# Patient Record
Sex: FEMALE | Race: BLACK OR AFRICAN AMERICAN | NOT HISPANIC OR LATINO | Employment: OTHER | URBAN - METROPOLITAN AREA
[De-identification: names, ages, dates, MRNs, and addresses within clinical notes are randomized per-mention and may not be internally consistent; named-entity substitution may affect disease eponyms.]

---

## 2017-12-28 ENCOUNTER — HOSPITAL ENCOUNTER (EMERGENCY)
Facility: MEDICAL CENTER | Age: 22
End: 2017-12-28
Attending: EMERGENCY MEDICINE
Payer: MEDICAID

## 2017-12-28 VITALS
WEIGHT: 178.57 LBS | DIASTOLIC BLOOD PRESSURE: 76 MMHG | HEIGHT: 68 IN | BODY MASS INDEX: 27.06 KG/M2 | HEART RATE: 84 BPM | RESPIRATION RATE: 16 BRPM | TEMPERATURE: 98.4 F | SYSTOLIC BLOOD PRESSURE: 131 MMHG | OXYGEN SATURATION: 98 %

## 2017-12-28 DIAGNOSIS — K12.1 ORAL ULCER: ICD-10-CM

## 2017-12-28 PROCEDURE — 99283 EMERGENCY DEPT VISIT LOW MDM: CPT

## 2017-12-28 ASSESSMENT — PAIN SCALES - GENERAL: PAINLEVEL_OUTOF10: 5

## 2017-12-29 NOTE — DISCHARGE INSTRUCTIONS
Use over-the-counter Orajel     Oral Ulcers  Oral ulcers are painful, shallow sores around the lining of the mouth. They can affect the gums, the inside of the lips, and the cheeks. (Sores on the outside of the lips and on the face are different.) They typically first occur in school-aged children and teenagers. Oral ulcers may also be called canker sores or cold sores.  CAUSES   Canker sores and cold sores can be caused by many factors including:  · Infection.  · Injury.  · Sun exposure.  · Medications.  · Emotional stress.  · Food allergies.  · Vitamin deficiencies.  · Toothpastes containing sodium lauryl sulfate.  The herpes virus can be the cause of mouth ulcers. The first infection can be severe and cause 10 or more ulcers on the gums, tongue, and lips with fever and difficulty in swallowing. This infection usually occurs between the ages of 1 and 3 years.   SYMPTOMS   The typical sore is about ¼ inch (6 mm) in size and is an oval or round ulcer with red borders.  DIAGNOSIS   Your caregiver can diagnose simple oral ulcers by examination. Additional testing is usually not required.   TREATMENT   Treatment is aimed at pain relief. Generally, oral ulcers resolve by themselves within 1 to 2 weeks without medication and are not contagious unless caused by herpes (and other viruses). Antibiotics are not effective with mouth sores. Avoid direct contact with others until the ulcer is completely healed. See your caregiver for follow-up care as recommended. Also:  · Offer a soft diet.  · Encourage plenty of fluids to prevent dehydration. Popsicles and milk shakes can be helpful.  · Avoid acidic and salty foods and drinks such as orange juice.  · Infants and young children will often refuse to drink because of pain. Using a teaspoon, cup, or syringe to give small amounts of fluids frequently can help prevent dehydration.  · Cold compresses on the face may help reduce pain.  · Pain medication can help control  soreness.  · A solution of diphenhydramine mixed with a liquid antacid can be useful to decrease the soreness of ulcers. Consult a caregiver for the dosing.  · Liquids or ointments with a numbing ingredient may be helpful when used as recommended.  · Older children and teenagers can rinse their mouth with a salt-water mixture (1/2 teaspoon of salt in 8 ounces of water) four times a day. This treatment is uncomfortable but may reduce the time the ulcers are present.  · There are many over-the-counter throat lozenges and medications available for oral ulcers. Their effectiveness has not been studied.  · Consult your medical caregiver prior to using homeopathic treatments for oral ulcers.  SEEK MEDICAL CARE IF:   · You think your child needs to be seen.  · The pain worsens and you cannot control it.  · There are 4 or more ulcers.  · The lips and gums begin to bleed and crust.  · A single mouth ulcer is near a tooth that is causing a toothache or pain.  · Your child has a fever, swollen face, or swollen glands.  · The ulcers began after starting a medication.  · Mouth ulcers keep reoccurring or last more than 2 weeks.  · You think your child is not taking adequate fluids.  SEEK IMMEDIATE MEDICAL CARE IF:   · Your child has a high fever.  · Your child is unable to swallow or becomes dehydrated.  · Your child looks or acts very ill.  · An ulcer caused by a chemical your child accidentally put in their mouth.     This information is not intended to replace advice given to you by your health care provider. Make sure you discuss any questions you have with your health care provider.     Document Released: 01/25/2006 Document Revised: 01/08/2016 Document Reviewed: 09/09/2010  [employer] Patient Education ©2016 [employer]

## 2017-12-29 NOTE — ED PROVIDER NOTES
"ED Provider Note    CHIEF COMPLAINT  Chief Complaint   Patient presents with   • Mouth Ulcer       HPI  Monica Roman is a 22 y.o. female who presentsComplaining of a painful oral ulcer in her mouth. It hurts her to eat, especially spicy foods. She denies cough or cold symptoms. No vomiting or diarrhea    REVIEW OF SYSTEMS  See HPI for further details. No fever no chills. No cough or cold symptoms.    PAST MEDICAL HISTORY  Past Medical History:   Diagnosis Date   • Asthma     childhood       FAMILY HISTORY  History reviewed. No pertinent family history.    SOCIAL HISTORY  Social History     Social History   • Marital status: Single     Spouse name: N/A   • Number of children: N/A   • Years of education: N/A     Social History Main Topics   • Smoking status: Never Smoker   • Smokeless tobacco: Never Used   • Alcohol use Yes      Comment: Occasionally   • Drug use:      Types: Inhaled      Comment: THC daily    • Sexual activity: Not on file     Other Topics Concern   • Not on file     Social History Narrative   • No narrative on file       SURGICAL HISTORY  History reviewed. No pertinent surgical history.    CURRENT MEDICATIONS  Home Medications     Reviewed by Annika Bangura (Pharmacy Tech) on 12/28/17 at 1656  Med List Status: Complete   Medication Last Dose Status        Patient Noble Taking any Medications                       ALLERGIES  No Known Allergies    PHYSICAL EXAM  VITAL SIGNS: /69   Pulse 72   Temp 36.9 °C (98.4 °F)   Resp 18   Ht 1.727 m (5' 8\") Comment: Stated  Wt 81 kg (178 lb 9.2 oz)   LMP 12/28/2017   SpO2 99%   BMI 27.15 kg/m²    Constitutional: Well developed, Well nourished, No acute distress,Pleasant, smiling, conversant  HENT: Normocephalic, Atraumatic, Bilateral external ears normal, 2 2 x 3 mm ulcerations at the base of the 23rd and 24th tooth on the lingual side, Nose normal.   Eyes: PERRLA, EOMI,   Neck: No swelling. No malocclusion  Lymphatic: Lymphadenopathy. "   Cardiovascular: Normal heart rate, Normal rhythm, No murmurs,   Thorax & Lungs: Normal breath sounds bilaterally      COURSE & MEDICAL DECISION MAKING  Pertinent Labs & Imaging studies reviewed. (See chart for details)  Patient has oral ulcerations on the gumline. This is not widespread. I recommended over-the-counter Orajel. Patient was reassured and discharged home in stable condition    FINAL IMPRESSION  1. Oral ulcer  2.   3.             Electronically signed by: Mark Talbot, 12/28/2017 7:14 PM

## 2018-07-30 ENCOUNTER — OFFICE VISIT (OUTPATIENT)
Dept: INTERNAL MEDICINE | Facility: MEDICAL CENTER | Age: 23
End: 2018-07-30
Payer: MEDICAID

## 2018-07-30 VITALS
TEMPERATURE: 98.6 F | BODY MASS INDEX: 27.47 KG/M2 | WEIGHT: 175 LBS | HEIGHT: 67 IN | HEART RATE: 79 BPM | DIASTOLIC BLOOD PRESSURE: 69 MMHG | OXYGEN SATURATION: 100 % | SYSTOLIC BLOOD PRESSURE: 122 MMHG

## 2018-07-30 DIAGNOSIS — J45.20 MILD INTERMITTENT ASTHMA WITHOUT COMPLICATION: Chronic | ICD-10-CM

## 2018-07-30 DIAGNOSIS — M54.6 ACUTE MIDLINE THORACIC BACK PAIN: ICD-10-CM

## 2018-07-30 PROCEDURE — 99203 OFFICE O/P NEW LOW 30 MIN: CPT | Mod: GE | Performed by: INTERNAL MEDICINE

## 2018-07-30 RX ORDER — ALBUTEROL SULFATE 90 UG/1
2 AEROSOL, METERED RESPIRATORY (INHALATION) EVERY 6 HOURS PRN
Qty: 8.5 G | Refills: 0 | Status: SHIPPED | OUTPATIENT
Start: 2018-07-30

## 2018-07-30 ASSESSMENT — PATIENT HEALTH QUESTIONNAIRE - PHQ9: CLINICAL INTERPRETATION OF PHQ2 SCORE: 0

## 2018-07-30 NOTE — LETTER
July 30, 2018         Patient: Monica Roman   YOB: 1995   Date of Visit: 7/30/2018           To Whom it May Concern:    Monica Roman was seen in my clinic on 7/30/2018. She may return to work on 07/31/2018.    If you have any questions or concerns, please don't hesitate to call.        Sincerely,           Sharmin Monaco M.D.  Electronically Signed

## 2018-07-31 NOTE — PROGRESS NOTES
New Patient to Establish    Reason to establish: Acute Illness    CC: Back pain. Neck pain.    HPI: Patient is a 23-year-old female with history of asthma here to establish care with acute complaint of back and neck pain. Patient reports that her pain started about 2 weeks ago after she missed a step off of the sidewalk and felt a pulling sensation in her back. She reports that she now has back and neck pain at the end of the workday. Reports that she usually works about 8 hours day standing up and towards the end she has pain when bending down and moving her neck. Reports that her symptoms exacerbate with immobility and improve with massage and hot showers. She denies other trauma, history of back pain in the past, or history of back trauma.    Otherwise, the patient reports being generally healthy. She had childhood asthma that she grew out of and has not had any recent exacerbations. She's never had any surgeries. She smokes marijuana recreationally and drinks socially (usually involves binge drinking). Uses Depo-Provera and condoms for contraception and prevention of sexually transmitted infections respectively. All vaccinations are up-to-date including the Gardasil vaccine.    Patient Active Problem List    Diagnosis Date Noted   • Syncope 03/14/2016     Priority: High   • Asthma 03/15/2016     Priority: Low   • Sinus pause 03/14/2016       Past Medical History:   Diagnosis Date   • Asthma     childhood       Current Outpatient Prescriptions   Medication Sig Dispense Refill   • albuterol 108 (90 Base) MCG/ACT Aero Soln inhalation aerosol Inhale 2 Puffs by mouth every 6 hours as needed for Shortness of Breath. 8.5 g 0     No current facility-administered medications for this visit.        Allergies as of 07/30/2018   • (No Known Allergies)       Social History     Social History   • Marital status: Single     Spouse name: N/A   • Number of children: N/A   • Years of education: N/A     Occupational History   •  "Not on file.     Social History Main Topics   • Smoking status: Never Smoker   • Smokeless tobacco: Never Used   • Alcohol use Yes      Comment: Occasionally   • Drug use: Yes     Types: Inhaled      Comment: THC daily    • Sexual activity: Yes     Partners: Male     Birth control/ protection: Condom, Pill     Other Topics Concern   • Not on file     Social History Narrative   • No narrative on file       Family History   Problem Relation Age of Onset   • Arthritis Mother    • Autoimmune Disease Mother    • Hypertension Mother        History reviewed. No pertinent surgical history.    ROS: As per HPI. Additional pertinent symptoms as noted below.    All others negative    /69   Pulse 79   Temp 37 °C (98.6 °F)   Ht 1.702 m (5' 7\")   Wt 79.4 kg (175 lb)   SpO2 100%   BMI 27.41 kg/m²     Physical Exam  General:  Alert and oriented, No apparent distress.    Eyes: Pupils equal and reactive. No scleral icterus.    Throat: Clear no erythema or exudates noted.    Neck: Supple. No lymphadenopathy noted. Thyroid not enlarged.    Lungs: Clear to auscultation and percussion bilaterally.    Cardiovascular: Regular rate and rhythm. No murmurs, rubs or gallops.    Abdomen:  Benign. No rebound or guarding noted.    Extremities: No clubbing, cyanosis, edema.    Skin: Clear. No rash or suspicious skin lesions noted.    Other: No tenderness palpation along the spinal processes. No tenderness to palpation along the paraspinal muscles. Tense muscles in the neck and shoulders.    Note: I have reviewed all pertinent labs and diagnostic tests associated with this visit with specific comments listed under the assessment and plan below    Assessment and Plan    1. Acute midline thoracic back pain  Patient with back pain which is likely due to muscle strain. Patient has no red flags.  - Related patient with [facility] for home exercises for neck and back pain.  - Advised patient that she could use " over-the-counter ointments to help with her muscular pain such as icy hot, BenGay, or Tiger balm.  - Counseled the patient that if her back pain does not resolve in the next few weeks, we can provide her with a referral for formal physical therapy.    2. Mild intermittent asthma without complication  Patient currently asymptomatic. Has not had to use her albuterol inhaler in a long time.  - Refill albuterol inhaler for emergency use.      Followup: Return in about 1 year (around 7/30/2019), or if symptoms worsen or fail to improve.      Signed by: Sharmin Monaco M.D.

## 2019-02-24 ENCOUNTER — HOSPITAL ENCOUNTER (EMERGENCY)
Facility: MEDICAL CENTER | Age: 24
End: 2019-02-24
Attending: EMERGENCY MEDICINE
Payer: MEDICAID

## 2019-02-24 VITALS
WEIGHT: 161.16 LBS | RESPIRATION RATE: 18 BRPM | DIASTOLIC BLOOD PRESSURE: 65 MMHG | HEART RATE: 60 BPM | SYSTOLIC BLOOD PRESSURE: 114 MMHG | OXYGEN SATURATION: 96 % | BODY MASS INDEX: 25.9 KG/M2 | HEIGHT: 66 IN | TEMPERATURE: 99.6 F

## 2019-02-24 DIAGNOSIS — J02.9 EXUDATIVE PHARYNGITIS: ICD-10-CM

## 2019-02-24 PROCEDURE — 99283 EMERGENCY DEPT VISIT LOW MDM: CPT

## 2019-02-24 RX ORDER — PENICILLIN V POTASSIUM 500 MG/1
500 TABLET ORAL EVERY 6 HOURS
Qty: 40 TAB | Refills: 0 | Status: SHIPPED | OUTPATIENT
Start: 2019-02-24 | End: 2019-03-06

## 2019-02-24 RX ORDER — HYDROCODONE BITARTRATE AND ACETAMINOPHEN 5; 325 MG/1; MG/1
1-2 TABLET ORAL EVERY 4 HOURS PRN
Qty: 10 TAB | Refills: 0 | Status: SHIPPED | OUTPATIENT
Start: 2019-02-24 | End: 2019-03-01

## 2019-02-24 NOTE — ED PROVIDER NOTES
"ED Provider Note    CHIEF COMPLAINT  Chief Complaint   Patient presents with   • Sore Throat     sore throat  headache  x 3 days       HPI  Monica Roman is a 23 y.o. female who presents for evaluation of sore throat.  Patient presents with 3-day history of sore throat along with low-grade fever.  She denies: Nasal congestion, purulent rhinorrhea, cough, sputum, hemoptysis, abdominal pain.  She denies any possibility of pregnancy.  No other acute symptomatology or complaints.    REVIEW OF SYSTEMS  See HPI for further details.  No history of diabetes, thyroid dysfunction, seizures, cardiac disorders, gastrointestinal disorder, recurrent pharyngitis.  He does relate a history of asthma.  All other systems negative.    PAST MEDICAL HISTORY  Past Medical History:   Diagnosis Date   • Asthma     childhood       FAMILY HISTORY  Family History   Problem Relation Age of Onset   • Arthritis Mother    • Autoimmune Disease Mother    • Hypertension Mother        SOCIAL HISTORY  Non-smoker; occasional alcohol use; positive marijuana use;    SURGICAL HISTORY  History reviewed. No pertinent surgical history.    CURRENT MEDICATIONS  See nurse's notes    ALLERGIES  No Known Allergies    PHYSICAL EXAM  VITAL SIGNS: /68   Pulse (!) 56   Temp 37.6 °C (99.6 °F) (Temporal)   Resp 16   Ht 1.676 m (5' 6\")   Wt 73.1 kg (161 lb 2.5 oz)   LMP 02/18/2019 (Approximate)   SpO2 96%   Breastfeeding? No   BMI 26.01 kg/m²    Constitutional: 23-year-old female, well developed, Well nourished, sitting comfortably, oriented x3  HENT: Normocephalic, Atraumatic, Nares:Clear, Oropharynx: moist, well hydrated, posterior pharynx: Erythematous with whitish exudate; there are a few small round ulcerative lesions over the mucosa of the lip and buccal mucosal area;  Eyes: PERRL, EOMI, Conjunctiva normal, No discharge.   Neck: Normal range of motion, No tenderness, Supple, No stridor.   Lymphatic: No lymphadenopathy noted.   Cardiovascular: " Regular rate and rhythm without mumurs, gallups, rubs   Thorax & Lungs: Normal Equal breath sounds, No respiratory distress, No wheezing, no stridor, no rales. No chest tenderness.   Skin: Good skin turgor, pink, warm, dry. No rashes, petechiae, purpura. Normal capillary refill.   Neurologic: Alert & oriented x 3,  No gross focal deficits noted.   Psychiatric: Affect normal, Judgment normal, Mood normal.     COURSE & MEDICAL DECISION MAKING  Pertinent Labs & Imaging studies reviewed. (See chart for details)  Discussion: At this time, patient presents with findings consistent with an exudative pharyngitis.  Patient has no clinical findings I feel that antibiotic therapy is warranted.  I do not see any complications of require laboratory or imaging studies.  I discussed the findings and treatment plan with the patient.  She indicates she is comfortable with this explanation disposition.    FINAL IMPRESSION  1. Exudative pharyngitis           PLAN  1.  Appropriate discharge instructions given  2.  Pen-Vee K 500 mg #40  3.  Lortab 5 mg #10;  databank reviewed; informed consent obtained;  4.  Follow-up with primary care    Electronically signed by: Guy G Gansert, 2/24/2019 3:58 PM

## 2019-09-20 ENCOUNTER — APPOINTMENT (OUTPATIENT)
Dept: RADIOLOGY | Facility: IMAGING CENTER | Age: 24
End: 2019-09-20
Attending: FAMILY MEDICINE
Payer: COMMERCIAL

## 2019-09-20 ENCOUNTER — OFFICE VISIT (OUTPATIENT)
Dept: URGENT CARE | Facility: CLINIC | Age: 24
End: 2019-09-20
Payer: COMMERCIAL

## 2019-09-20 VITALS
OXYGEN SATURATION: 98 % | SYSTOLIC BLOOD PRESSURE: 104 MMHG | TEMPERATURE: 98.2 F | HEIGHT: 66 IN | DIASTOLIC BLOOD PRESSURE: 68 MMHG | RESPIRATION RATE: 18 BRPM | HEART RATE: 100 BPM | BODY MASS INDEX: 28.12 KG/M2 | WEIGHT: 175 LBS

## 2019-09-20 DIAGNOSIS — S63.615A SPRAIN OF LEFT RING FINGER, UNSPECIFIED SITE OF FINGER, INITIAL ENCOUNTER: ICD-10-CM

## 2019-09-20 PROCEDURE — 99214 OFFICE O/P EST MOD 30 MIN: CPT | Performed by: FAMILY MEDICINE

## 2019-09-20 PROCEDURE — 73140 X-RAY EXAM OF FINGER(S): CPT | Mod: TC,LT | Performed by: FAMILY MEDICINE
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
## 2019-09-26 ENCOUNTER — OFFICE VISIT (OUTPATIENT)
Dept: MEDICAL GROUP | Facility: CLINIC | Age: 24
End: 2019-09-26
Payer: COMMERCIAL

## 2019-09-26 VITALS
TEMPERATURE: 98.7 F | WEIGHT: 175 LBS | SYSTOLIC BLOOD PRESSURE: 116 MMHG | DIASTOLIC BLOOD PRESSURE: 72 MMHG | HEIGHT: 66 IN | OXYGEN SATURATION: 97 % | HEART RATE: 82 BPM | RESPIRATION RATE: 16 BRPM | BODY MASS INDEX: 28.12 KG/M2

## 2019-09-26 DIAGNOSIS — S62.655A NONDISPLACED FRACTURE OF MIDDLE PHALANX OF LEFT RING FINGER, INITIAL ENCOUNTER FOR CLOSED FRACTURE: ICD-10-CM

## 2019-09-26 PROCEDURE — 26720 TREAT FINGER FRACTURE EACH: CPT | Mod: F3 | Performed by: FAMILY MEDICINE

## 2019-09-26 ASSESSMENT — ENCOUNTER SYMPTOMS
HEADACHES: 0
VOMITING: 0
SHORTNESS OF BREATH: 0
DIZZINESS: 0
FEVER: 0
CHILLS: 0
NAUSEA: 0
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
## 2019-10-30 ENCOUNTER — NON-PROVIDER VISIT (OUTPATIENT)
Dept: URGENT CARE | Facility: CLINIC | Age: 24
End: 2019-10-30

## 2019-10-30 DIAGNOSIS — Z02.1 PRE-EMPLOYMENT DRUG SCREENING: Primary | ICD-10-CM

## 2019-10-30 LAB
AMP AMPHETAMINE: NORMAL
BAR BARBITURATES: NORMAL
BZO BENZODIAZEPINES: NORMAL
COC COCAINE: NORMAL
INT CON NEG: NEGATIVE
INT CON POS: POSITIVE
MDMA ECSTASY: NORMAL
MET METHAMPHETAMINES: NORMAL
MTD METHADONE: NORMAL
OPI OPIATES: NORMAL
OXY OXYCODONE: NORMAL
PCP PHENCYCLIDINE: NORMAL
POC URINE DRUG SCREEN OCDRS: NEGATIVE
THC: NORMAL

## 2019-10-30 PROCEDURE — 80305 DRUG TEST PRSMV DIR OPT OBS: CPT | Performed by: PHYSICIAN ASSISTANT

## 2020-06-22 ENCOUNTER — APPOINTMENT (OUTPATIENT)
Dept: RADIOLOGY | Facility: IMAGING CENTER | Age: 25
End: 2020-06-22
Attending: PHYSICIAN ASSISTANT
Payer: COMMERCIAL

## 2020-06-22 ENCOUNTER — OFFICE VISIT (OUTPATIENT)
Dept: URGENT CARE | Facility: CLINIC | Age: 25
End: 2020-06-22
Payer: COMMERCIAL

## 2020-06-22 VITALS
OXYGEN SATURATION: 98 % | DIASTOLIC BLOOD PRESSURE: 64 MMHG | BODY MASS INDEX: 27.31 KG/M2 | HEIGHT: 67 IN | SYSTOLIC BLOOD PRESSURE: 102 MMHG | HEART RATE: 103 BPM | WEIGHT: 174 LBS | TEMPERATURE: 98.6 F | RESPIRATION RATE: 12 BRPM

## 2020-06-22 DIAGNOSIS — M25.461 EFFUSION OF RIGHT KNEE JOINT: ICD-10-CM

## 2020-06-22 DIAGNOSIS — S86.911A KNEE STRAIN, RIGHT, INITIAL ENCOUNTER: ICD-10-CM

## 2020-06-22 DIAGNOSIS — M25.561 ACUTE PAIN OF RIGHT KNEE: ICD-10-CM

## 2020-06-22 PROCEDURE — 99214 OFFICE O/P EST MOD 30 MIN: CPT | Performed by: PHYSICIAN ASSISTANT

## 2020-06-22 PROCEDURE — 73564 X-RAY EXAM KNEE 4 OR MORE: CPT | Mod: TC,RT | Performed by: PHYSICIAN ASSISTANT

## 2020-06-22 RX ORDER — IBUPROFEN 800 MG/1
800 TABLET ORAL EVERY 8 HOURS PRN
Qty: 30 TAB | Refills: 0 | Status: SHIPPED | OUTPATIENT
Start: 2020-06-22

## 2020-06-22 ASSESSMENT — ENCOUNTER SYMPTOMS
SENSORY CHANGE: 0
SHORTNESS OF BREATH: 0
FEVER: 0
TINGLING: 0
WEAKNESS: 0
SORE THROAT: 0
CHILLS: 0
NAUSEA: 0
PALPITATIONS: 0
BLURRED VISION: 0
NUMBNESS: 0
VOMITING: 0

## 2020-06-22 NOTE — PROGRESS NOTES
Subjective:      Monica Roman is a 25 y.o. female who presents with Knee Pain (fell last month, would rub thc cream for relief but pain got worse over the weekend)      Knee Pain   This is a new problem. The current episode started 1 to 4 weeks ago (One month ago). The problem occurs constantly. The problem has been rapidly worsening (Has gotten much worse over the past 2 days). Pertinent negatives include no chest pain, chills, fever, nausea, numbness, sore throat, vomiting or weakness. Exacerbated by: Walking. Staying in any position for prolonged periods. Treatments tried: THC cream. The treatment provided mild relief.   Patient states that she fell last month.  She was having pain in her knee but was rubbing THC cream on it and states it was provided mild relief.  Says it was gradually improving but then over the weekend she went out and noticed that the next morning when she woke up she was having significant increased pain in the right knee.    Review of Systems   Constitutional: Negative for chills and fever.   HENT: Negative for sore throat.    Eyes: Negative for blurred vision.   Respiratory: Negative for shortness of breath.    Cardiovascular: Negative for chest pain and palpitations.   Gastrointestinal: Negative for nausea and vomiting.   Musculoskeletal: Positive for joint pain (right knee).   Neurological: Negative for tingling, sensory change, weakness and numbness.       PMH:  has a past medical history of Asthma.  MEDS:   Current Outpatient Medications:   •  albuterol 108 (90 Base) MCG/ACT Aero Soln inhalation aerosol, Inhale 2 Puffs by mouth every 6 hours as needed for Shortness of Breath. (Patient not taking: Reported on 9/20/2019), Disp: 8.5 g, Rfl: 0  ALLERGIES: No Known Allergies  SURGHX:   Past Surgical History:   Procedure Laterality Date   • DENTAL EXTRACTION(S)       SOCHX:  reports that she has never smoked. She has never used smokeless tobacco. She reports current alcohol use. She  "reports current drug use. Drug: Inhaled.  FH: Family history was reviewed, no pertinent findings to report     Objective:     /64   Pulse (!) 103   Temp 37 °C (98.6 °F) (Temporal)   Resp 12   Ht 1.689 m (5' 6.5\")   Wt 78.9 kg (174 lb)   SpO2 98%   BMI 27.66 kg/m²      Physical Exam  Constitutional:       Appearance: She is well-developed.   HENT:      Head: Normocephalic and atraumatic.      Right Ear: External ear normal.      Left Ear: External ear normal.   Eyes:      Conjunctiva/sclera: Conjunctivae normal.      Pupils: Pupils are equal, round, and reactive to light.   Cardiovascular:      Rate and Rhythm: Normal rate and regular rhythm.      Heart sounds: Normal heart sounds. No murmur.   Pulmonary:      Effort: Pulmonary effort is normal.      Breath sounds: Normal breath sounds. No wheezing.   Musculoskeletal:      Right knee: She exhibits decreased range of motion and swelling. She exhibits no ecchymosis, no deformity, no laceration, no LCL laxity, normal patellar mobility, no bony tenderness, normal meniscus and no MCL laxity. Tenderness found. MCL (mild) and patellar tendon (moderate TTP) tenderness noted. No medial joint line and no lateral joint line tenderness noted.      Comments: Antalgic gait.  Negative posterior and anterior drawer.  Negative Milagros's.  5/5 strength of lower extremities bilaterally.   Skin:     General: Skin is warm and dry.      Capillary Refill: Capillary refill takes less than 2 seconds.   Neurological:      Mental Status: She is alert and oriented to person, place, and time.   Psychiatric:         Behavior: Behavior normal.         Judgment: Judgment normal.         DX-KNEE COMPLETE 4+ RIGHT  IMPRESSION:     1.  Well-corticated ossific density anterior to the proximal tibia, likely related to old injury.  2.  No fracture or dislocation of RIGHT knee.  3.  Probable small joint effusion.      *X-rays were reviewed and interpreted independently by me. I agree with " the radiologist's findings     Assessment/Plan:     1. Acute pain of right knee  - DX-KNEE COMPLETE 4+ RIGHT; Future  - REFERRAL TO SPORTS MEDICINE  - ibuprofen (MOTRIN) 800 MG Tab; Take 1 Tab by mouth every 8 hours as needed.  Dispense: 30 Tab; Refill: 0    2. Effusion of right knee joint  - REFERRAL TO SPORTS MEDICINE  - ibuprofen (MOTRIN) 800 MG Tab; Take 1 Tab by mouth every 8 hours as needed.  Dispense: 30 Tab; Refill: 0    3. Knee strain, right, initial encounter  - REFERRAL TO SPORTS MEDICINE  - ibuprofen (MOTRIN) 800 MG Tab; Take 1 Tab by mouth every 8 hours as needed.  Dispense: 30 Tab; Refill: 0      - Right knee brace was applied  - Apply ice multiple times per day  - Keep elevated as much as possible  - Follow up with sports medicine for further evaluation and treatment

## 2022-02-21 ENCOUNTER — HOSPITAL ENCOUNTER (EMERGENCY)
Facility: MEDICAL CENTER | Age: 27
End: 2022-02-21
Attending: STUDENT IN AN ORGANIZED HEALTH CARE EDUCATION/TRAINING PROGRAM
Payer: COMMERCIAL

## 2022-02-21 VITALS
OXYGEN SATURATION: 100 % | HEIGHT: 66 IN | DIASTOLIC BLOOD PRESSURE: 71 MMHG | BODY MASS INDEX: 27.28 KG/M2 | HEART RATE: 58 BPM | SYSTOLIC BLOOD PRESSURE: 113 MMHG | TEMPERATURE: 97.8 F | WEIGHT: 169.75 LBS | RESPIRATION RATE: 18 BRPM

## 2022-02-21 DIAGNOSIS — L02.91 ABSCESS: Primary | ICD-10-CM

## 2022-02-21 LAB — HCG UR QL: NEGATIVE

## 2022-02-21 PROCEDURE — 700101 HCHG RX REV CODE 250: Performed by: STUDENT IN AN ORGANIZED HEALTH CARE EDUCATION/TRAINING PROGRAM

## 2022-02-21 PROCEDURE — A9270 NON-COVERED ITEM OR SERVICE: HCPCS | Performed by: STUDENT IN AN ORGANIZED HEALTH CARE EDUCATION/TRAINING PROGRAM

## 2022-02-21 PROCEDURE — 81025 URINE PREGNANCY TEST: CPT

## 2022-02-21 PROCEDURE — 303977 HCHG I & D

## 2022-02-21 PROCEDURE — 700102 HCHG RX REV CODE 250 W/ 637 OVERRIDE(OP): Performed by: STUDENT IN AN ORGANIZED HEALTH CARE EDUCATION/TRAINING PROGRAM

## 2022-02-21 PROCEDURE — 99283 EMERGENCY DEPT VISIT LOW MDM: CPT

## 2022-02-21 RX ORDER — LIDOCAINE HYDROCHLORIDE AND EPINEPHRINE BITARTRATE 20; .01 MG/ML; MG/ML
10 INJECTION, SOLUTION SUBCUTANEOUS ONCE
Status: DISCONTINUED | OUTPATIENT
Start: 2022-02-21 | End: 2022-02-21

## 2022-02-21 RX ORDER — IBUPROFEN 600 MG/1
600 TABLET ORAL ONCE
Status: COMPLETED | OUTPATIENT
Start: 2022-02-21 | End: 2022-02-21

## 2022-02-21 RX ORDER — SULFAMETHOXAZOLE AND TRIMETHOPRIM 800; 160 MG/1; MG/1
1 TABLET ORAL 2 TIMES DAILY
Qty: 10 TABLET | Refills: 0 | Status: SHIPPED | OUTPATIENT
Start: 2022-02-21 | End: 2022-02-26

## 2022-02-21 RX ADMIN — IBUPROFEN 600 MG: 600 TABLET, FILM COATED ORAL at 19:42

## 2022-02-21 RX ADMIN — LIDOCAINE HYDROCHLORIDE 10 ML: 10 INJECTION, SOLUTION INFILTRATION; PERINEURAL at 18:50

## 2022-02-21 ASSESSMENT — ENCOUNTER SYMPTOMS
SPEECH CHANGE: 0
FEVER: 0
LOSS OF CONSCIOUSNESS: 0
CHILLS: 0
EYE DISCHARGE: 0
EYE REDNESS: 0

## 2022-02-21 ASSESSMENT — LIFESTYLE VARIABLES: DO YOU DRINK ALCOHOL: NO

## 2022-02-22 NOTE — ED NOTES
Discharge instructions provided, pt verbalizes understanding and has no questions. Vital signs stable, pt ambulated out of ER with steady gate.  Pt given supplies to change bandage.

## 2022-02-22 NOTE — ED TRIAGE NOTES
"Chief Complaint   Patient presents with   • Abscess     Started three days ago and pt thought it was an ingrown hair.         Pt has abscess on groin area. Pt reports that the pain has gotten worse and she scheduled an appointment with OBGYN but couldn't wait. Pt was recently STD tested and everything was negative.       Pt ambulated to triage for above complaint.  Pt is AO x 4, follows commands, and responds appropriately to questions. Patient's breathing is unalbored and pain is currently 8/10 on the 0-10 pain scale.  Pt placed in lobby. Patient educated on triage process and encouraged to alert staff for any changes.    /76   Pulse (!) 105   Temp 36.3 °C (97.4 °F) (Temporal)   Resp 18   Ht 1.676 m (5' 6\")   Wt 77 kg (169 lb 12.1 oz)   SpO2 99%     "

## 2022-02-22 NOTE — ED PROVIDER NOTES
"ED Provider Note    Chief Complaint:   Pubic abscess    HPI:  Monica Roman is a very pleasant 26-year-old female who presents with swelling, pain, redness, tenderness which has been increasing for the past 3 days on her mons pubis.  Patient reports that she had thought it was an ingrown hair initially and attempted to \"pop it\".  Patient denies vaginal bleeding or discharge, dysuria, hematuria, fevers or chills.    Review of Systems:  Review of Systems   Constitutional: Negative for chills and fever.   Eyes: Negative for discharge and redness.   Neurological: Negative for speech change and loss of consciousness.       Past Medical History:   has a past medical history of Asthma.    Social History:  Social History     Tobacco Use   • Smoking status: Never Smoker   • Smokeless tobacco: Never Used   Vaping Use   • Vaping Use: Never used   Substance and Sexual Activity   • Alcohol use: Yes     Comment: Occasionally   • Drug use: Yes     Types: Inhaled     Comment: THC daily    • Sexual activity: Yes     Partners: Male     Birth control/protection: Condom, Pill       Surgical History:   has a past surgical history that includes dental extraction(s).    Allergies:  No Known Allergies    Physical Exam:  Vital Signs: /76   Pulse (!) 105   Temp 36.3 °C (97.4 °F) (Temporal)   Resp 18   Ht 1.676 m (5' 6\")   Wt 77 kg (169 lb 12.1 oz)   LMP 02/07/2022   SpO2 99%   BMI 27.40 kg/m²   Physical Exam  Constitutional:       Appearance: She is not toxic-appearing.   HENT:      Head: Normocephalic and atraumatic.   Pulmonary:      Effort: Pulmonary effort is normal. No respiratory distress.   Genitourinary:     Comments: Patient has a fluctuant mass with tenderness to palpation and small localized central abrasion at the right side of the mons pubis without evidence of discharge.  Vagina within normal limits externally.  Skin:     General: Skin is warm and dry.   Neurological:      Mental Status: She is alert. "         Labs:  Labs Reviewed   HCG QUALITATIVE UR       Radiology:  No orders to display        MDM:  Patient presenting with evidence of an abscess which is likely secondary to folliculitis.  Patient afebrile, denies constitutional symptoms, chills or myalgias, body aches, sepsis is inconsistent with patient presentation at this time.  Disposition pending abscess drainage.    Electronically signed by: Adalberto Phelan M.D., 2/21/2022, 6:28 PM    Incision and drainage carried out without complication, patient tolerated well.  Patient given Bactrim outpatient and instructed on ibuprofen and Tylenol for symptom control.  Patient given ibuprofen in the emergency department prior to discharge.    Repeat physical exam benign.  I doubt any serious emergency process at this time.  Patient and/or family, friends given strict return precautions and care instructions. They have demonstrated understanding of discharge instructions through teach back mechanism. Advised PCP follow-up in 1-2 days.  Patient/family/friend expresses understanding and agrees to plan.    This dictation has been created using voice recognition software. I am continuously working with the software to minimize the number of voice recognition errors and I have made every attempt to manually correct the errors within my dictation. However errors  related to this voice recognition software may still exist and should be interpreted within the appropriate context.     Electronically signed by: Adalberto Phelan M.D., 2/21/2022 7:17 PM      Procedure - Incision and Drainage    Patient consented for procedure following description of procedure and pros/cons explained.  Cleaned and draped per hospital procedure.  Pt positioned appropriately, 7 cc 1% lidocaine without epinephrine was used as a local anesthetic. #11 blade scalpal used for single incision. Additional local anesthetic injected into surrounding viable tissue prior to blunt dissection of  loculated adhesions. Copius drainage of pus (culture obtained).  Loop drainage was put in place without complications. Wound dressed with sterile 4x4 guaze and paper tape.  Patient counseled to have the drainage removed in 3 days, she reports she is following with gynecology at this time and may have this completed.      Disposition:  Home    Final Impression:  1. Abscess

## 2022-06-03 ENCOUNTER — NON-PROVIDER VISIT (OUTPATIENT)
Dept: OCCUPATIONAL MEDICINE | Facility: CLINIC | Age: 27
End: 2022-06-03

## 2022-06-03 DIAGNOSIS — Z02.89 ENCOUNTER FOR OCCUPATIONAL HEALTH ASSESSMENT: Primary | ICD-10-CM

## 2022-06-03 LAB
AMP AMPHETAMINE: NORMAL
COC COCAINE: NORMAL
INT CON NEG: NORMAL
INT CON POS: NORMAL
MET METHAMPHETAMINES: NORMAL
OPI OPIATES: NORMAL
PCP PHENCYCLIDINE: NORMAL
POC DRUG COMMENT 753798-OCCUPATIONAL HEALTH: NORMAL
THC: NORMAL

## 2022-06-03 PROCEDURE — 80305 DRUG TEST PRSMV DIR OPT OBS: CPT | Performed by: PREVENTIVE MEDICINE

## 2023-04-26 ENCOUNTER — HOSPITAL ENCOUNTER (EMERGENCY)
Facility: MEDICAL CENTER | Age: 28
End: 2023-04-26
Attending: EMERGENCY MEDICINE
Payer: MEDICAID

## 2023-04-26 ENCOUNTER — APPOINTMENT (OUTPATIENT)
Dept: RADIOLOGY | Facility: MEDICAL CENTER | Age: 28
End: 2023-04-26
Attending: EMERGENCY MEDICINE
Payer: MEDICAID

## 2023-04-26 VITALS
SYSTOLIC BLOOD PRESSURE: 107 MMHG | BODY MASS INDEX: 27.43 KG/M2 | WEIGHT: 181 LBS | HEIGHT: 68 IN | TEMPERATURE: 98 F | DIASTOLIC BLOOD PRESSURE: 65 MMHG | RESPIRATION RATE: 16 BRPM | OXYGEN SATURATION: 98 % | HEART RATE: 74 BPM

## 2023-04-26 DIAGNOSIS — O20.0 THREATENED MISCARRIAGE: ICD-10-CM

## 2023-04-26 LAB
ALBUMIN SERPL BCP-MCNC: 4.1 G/DL (ref 3.2–4.9)
ALBUMIN/GLOB SERPL: 1.7 G/DL
ALP SERPL-CCNC: 53 U/L (ref 30–99)
ALT SERPL-CCNC: 14 U/L (ref 2–50)
ANION GAP SERPL CALC-SCNC: 9 MMOL/L (ref 7–16)
APPEARANCE UR: CLEAR
AST SERPL-CCNC: 13 U/L (ref 12–45)
B-HCG SERPL-ACNC: 1890 MIU/ML (ref 0–5)
BASOPHILS # BLD AUTO: 0.8 % (ref 0–1.8)
BASOPHILS # BLD: 0.04 K/UL (ref 0–0.12)
BILIRUB SERPL-MCNC: 0.5 MG/DL (ref 0.1–1.5)
BILIRUB UR QL STRIP.AUTO: NEGATIVE
BUN SERPL-MCNC: 11 MG/DL (ref 8–22)
CALCIUM ALBUM COR SERPL-MCNC: 9 MG/DL (ref 8.5–10.5)
CALCIUM SERPL-MCNC: 9.1 MG/DL (ref 8.5–10.5)
CHLORIDE SERPL-SCNC: 108 MMOL/L (ref 96–112)
CO2 SERPL-SCNC: 22 MMOL/L (ref 20–33)
COLOR UR: YELLOW
CREAT SERPL-MCNC: 1.04 MG/DL (ref 0.5–1.4)
EOSINOPHIL # BLD AUTO: 0.11 K/UL (ref 0–0.51)
EOSINOPHIL NFR BLD: 2.2 % (ref 0–6.9)
ERYTHROCYTE [DISTWIDTH] IN BLOOD BY AUTOMATED COUNT: 46 FL (ref 35.9–50)
GFR SERPLBLD CREATININE-BSD FMLA CKD-EPI: 76 ML/MIN/1.73 M 2
GLOBULIN SER CALC-MCNC: 2.4 G/DL (ref 1.9–3.5)
GLUCOSE SERPL-MCNC: 83 MG/DL (ref 65–99)
GLUCOSE UR STRIP.AUTO-MCNC: NEGATIVE MG/DL
HCT VFR BLD AUTO: 39.5 % (ref 37–47)
HGB BLD-MCNC: 13.3 G/DL (ref 12–16)
IMM GRANULOCYTES # BLD AUTO: 0.01 K/UL (ref 0–0.11)
IMM GRANULOCYTES NFR BLD AUTO: 0.2 % (ref 0–0.9)
KETONES UR STRIP.AUTO-MCNC: NEGATIVE MG/DL
LEUKOCYTE ESTERASE UR QL STRIP.AUTO: NEGATIVE
LIPASE SERPL-CCNC: 22 U/L (ref 11–82)
LYMPHOCYTES # BLD AUTO: 2.07 K/UL (ref 1–4.8)
LYMPHOCYTES NFR BLD: 41.2 % (ref 22–41)
MCH RBC QN AUTO: 29.6 PG (ref 27–33)
MCHC RBC AUTO-ENTMCNC: 33.7 G/DL (ref 33.6–35)
MCV RBC AUTO: 88 FL (ref 81.4–97.8)
MICRO URNS: NORMAL
MONOCYTES # BLD AUTO: 0.4 K/UL (ref 0–0.85)
MONOCYTES NFR BLD AUTO: 8 % (ref 0–13.4)
NEUTROPHILS # BLD AUTO: 2.4 K/UL (ref 2–7.15)
NEUTROPHILS NFR BLD: 47.6 % (ref 44–72)
NITRITE UR QL STRIP.AUTO: NEGATIVE
NRBC # BLD AUTO: 0 K/UL
NRBC BLD-RTO: 0 /100 WBC
NUMBER OF RH DOSES IND 8505RD: NORMAL
PH UR STRIP.AUTO: 5.5 [PH] (ref 5–8)
PLATELET # BLD AUTO: 199 K/UL (ref 164–446)
PMV BLD AUTO: 9.5 FL (ref 9–12.9)
POTASSIUM SERPL-SCNC: 4.3 MMOL/L (ref 3.6–5.5)
PROT SERPL-MCNC: 6.5 G/DL (ref 6–8.2)
PROT UR QL STRIP: NEGATIVE MG/DL
RBC # BLD AUTO: 4.49 M/UL (ref 4.2–5.4)
RBC UR QL AUTO: NEGATIVE
RH BLD: NORMAL
SODIUM SERPL-SCNC: 139 MMOL/L (ref 135–145)
SP GR UR STRIP.AUTO: 1.02
UROBILINOGEN UR STRIP.AUTO-MCNC: 0.2 MG/DL
WBC # BLD AUTO: 5 K/UL (ref 4.8–10.8)

## 2023-04-26 PROCEDURE — 85025 COMPLETE CBC W/AUTO DIFF WBC: CPT

## 2023-04-26 PROCEDURE — 84702 CHORIONIC GONADOTROPIN TEST: CPT

## 2023-04-26 PROCEDURE — 83690 ASSAY OF LIPASE: CPT

## 2023-04-26 PROCEDURE — 36415 COLL VENOUS BLD VENIPUNCTURE: CPT

## 2023-04-26 PROCEDURE — 99284 EMERGENCY DEPT VISIT MOD MDM: CPT

## 2023-04-26 PROCEDURE — 80053 COMPREHEN METABOLIC PANEL: CPT

## 2023-04-26 PROCEDURE — 81003 URINALYSIS AUTO W/O SCOPE: CPT

## 2023-04-26 PROCEDURE — 76801 OB US < 14 WKS SINGLE FETUS: CPT

## 2023-04-26 PROCEDURE — 86901 BLOOD TYPING SEROLOGIC RH(D): CPT

## 2023-04-26 NOTE — ED TRIAGE NOTES
"Chief Complaint   Patient presents with    Vaginal Bleeding     C/o vaginal bleeding this am describes as \"spotting\". Approximately 12 weeks pregnant . Denies dysuria.     Abdominal Cramping     Rates pain as 3/10. NAD.   Vitals:    23 1153   BP: 102/68   Pulse: 70   Resp: 18   Temp: 36.7 °C (98.1 °F)   SpO2: 100%       "

## 2023-04-26 NOTE — ED PROVIDER NOTES
"ED Provider Note    CHIEF COMPLAINT  Chief Complaint   Patient presents with    Vaginal Bleeding     C/o vaginal bleeding this am describes as \"spotting\". Approximately 12 weeks pregnant . Denies dysuria.     Abdominal Cramping         HPI/ROS  Monica Roman is a 28 y.o. female who presents with vaginal bleeding.  The patient states her last normal menstrual period was the end of February.  She states she had a home pregnancy test and then followed up with the health department that did confirm a positive pregnancy test.  She has not seen an obstetrician and gynecologist.  She presents with cramping abdominal discomfort and vaginal bleeding that started this morning.  She states is more spotting.  She has not had any vomiting.  She denies difficulties with urination.  She has not had any associated fevers.  She does not have any vaginal discharge.  She is a .    PAST MEDICAL HISTORY   has a past medical history of Asthma.    SURGICAL HISTORY   has a past surgical history that includes dental extraction(s).    FAMILY HISTORY  Family History   Problem Relation Age of Onset    Arthritis Mother     Autoimmune Disease Mother     Hypertension Mother        SOCIAL HISTORY  Social History     Tobacco Use    Smoking status: Never    Smokeless tobacco: Never   Vaping Use    Vaping Use: Never used   Substance and Sexual Activity    Alcohol use: Yes     Comment: Occasionally    Drug use: Yes     Types: Inhaled     Comment: THC daily     Sexual activity: Yes     Partners: Male     Birth control/protection: Condom, Pill       CURRENT MEDICATIONS  Home Medications       Reviewed by Dorothea Christie R.N. (Registered Nurse) on 23 at 1215  Med List Status: Partial     Medication Last Dose Status   albuterol 108 (90 Base) MCG/ACT Aero Soln inhalation aerosol  Active   ibuprofen (MOTRIN) 800 MG Tab  Active                    ALLERGIES  No Known Allergies    PHYSICAL EXAM  VITAL SIGNS: /68   Pulse 70   Temp 36.7 °C " "(98.1 °F) (Temporal)   Resp 18   Ht 1.727 m (5' 8\")   Wt 82.1 kg (181 lb)   LMP  (LMP Unknown)   SpO2 100%   BMI 27.52 kg/m²    In general the patient does not appear toxic    HEENT unremarkable    Pulmonary chest clear to auscultation bilaterally    Cardiovascular S1-S2 with a regular rate and rhythm    GI abdomen soft with no tenderness and no distention.  The patient has normal bowel sounds     cervix is closed and nontender.  There is no active bleeding or vaginal discharge    Skin no pallor    Extremities no edema    Neurologic examination is grossly intact    DIAGNOSTIC STUDIES  Results for orders placed or performed during the hospital encounter of 04/26/23   CBC WITH DIFFERENTIAL   Result Value Ref Range    WBC 5.0 4.8 - 10.8 K/uL    RBC 4.49 4.20 - 5.40 M/uL    Hemoglobin 13.3 12.0 - 16.0 g/dL    Hematocrit 39.5 37.0 - 47.0 %    MCV 88.0 81.4 - 97.8 fL    MCH 29.6 27.0 - 33.0 pg    MCHC 33.7 33.6 - 35.0 g/dL    RDW 46.0 35.9 - 50.0 fL    Platelet Count 199 164 - 446 K/uL    MPV 9.5 9.0 - 12.9 fL    Neutrophils-Polys 47.60 44.00 - 72.00 %    Lymphocytes 41.20 (H) 22.00 - 41.00 %    Monocytes 8.00 0.00 - 13.40 %    Eosinophils 2.20 0.00 - 6.90 %    Basophils 0.80 0.00 - 1.80 %    Immature Granulocytes 0.20 0.00 - 0.90 %    Nucleated RBC 0.00 /100 WBC    Neutrophils (Absolute) 2.40 2.00 - 7.15 K/uL    Lymphs (Absolute) 2.07 1.00 - 4.80 K/uL    Monos (Absolute) 0.40 0.00 - 0.85 K/uL    Eos (Absolute) 0.11 0.00 - 0.51 K/uL    Baso (Absolute) 0.04 0.00 - 0.12 K/uL    Immature Granulocytes (abs) 0.01 0.00 - 0.11 K/uL    NRBC (Absolute) 0.00 K/uL   COMP METABOLIC PANEL   Result Value Ref Range    Sodium 139 135 - 145 mmol/L    Potassium 4.3 3.6 - 5.5 mmol/L    Chloride 108 96 - 112 mmol/L    Co2 22 20 - 33 mmol/L    Anion Gap 9.0 7.0 - 16.0    Glucose 83 65 - 99 mg/dL    Bun 11 8 - 22 mg/dL    Creatinine 1.04 0.50 - 1.40 mg/dL    Calcium 9.1 8.5 - 10.5 mg/dL    AST(SGOT) 13 12 - 45 U/L    ALT(SGPT) 14 2 - " 50 U/L    Alkaline Phosphatase 53 30 - 99 U/L    Total Bilirubin 0.5 0.1 - 1.5 mg/dL    Albumin 4.1 3.2 - 4.9 g/dL    Total Protein 6.5 6.0 - 8.2 g/dL    Globulin 2.4 1.9 - 3.5 g/dL    A-G Ratio 1.7 g/dL   LIPASE   Result Value Ref Range    Lipase 22 11 - 82 U/L   HCG QUANTITATIVE   Result Value Ref Range    Bhcg 1890.0 (H) 0.0 - 5.0 mIU/mL   RH TYPE FOR RHOGAM FROM E.D.   Result Value Ref Range    Emergency Department Rh Typing POS     Number Of Rh Doses Indicated ZERO    CORRECTED CALCIUM   Result Value Ref Range    Correct Calcium 9.0 8.5 - 10.5 mg/dL   ESTIMATED GFR   Result Value Ref Range    GFR (CKD-EPI) 76 >60 mL/min/1.73 m 2       RADIOLOGY  US-OB 1ST TRIMESTER WITH TRANSVAGINAL (COMBO)   Final Result      1.  Single living intrauterine pregnancy at 5 weeks, 0 days estimated gestational age.          COURSE & MEDICAL DECISION MAKING    ED Observation Status? Yes; I am placing the patient in to an observation status due to a diagnostic uncertainty as well as therapeutic intensity. Patient placed in observation status at 1:06 PM, 4/26/2023.     Observation plan is as follows: The patient presents with vaginal bleeding with a known pregnancy test.  She will require laboratory analysis including quantitative beta-hCG and Rh factor.  She will require an ultrasound to make sure there is no evidence of an ectopic pregnancy.  Therefore she will be admitted to emergency department under observation status as laboratory analysis and imaging is obtained.    1610 the patient was reexamined and continues have a benign abdominal exam.  She is not hypotensive nor is she anemic.  Her Rh factor is positive.  Ultrasound does show a single live intrauterine pregnancy at 5 weeks.  The patient will be discharged home with instructions to follow-up with the pregnancy center.  She will return for increased pain or bleeding.    Upon Reevaluation, the patient's condition has: Improved; and will be discharged.    Patient discharged  from ED Observation status at 1610 (Time) 4/26/23 (Date).       FINAL DIAGNOSIS  Threatened miscarriage    Disposition  Patient will be discharged in stable condition       Electronically signed by: Candido Streeter M.D., 4/26/2023 1:01 PM

## 2023-04-26 NOTE — ED NOTES
Pt given discharge instructions/home care instructions explained, pt verbalized understanding of instructions given/pt understands the importance of follow up, pt ambulatory to ER [facility].

## 2023-04-27 ENCOUNTER — APPOINTMENT (OUTPATIENT)
Dept: OBGYN | Facility: CLINIC | Age: 28
End: 2023-04-27
Payer: MEDICAID

## 2023-05-04 ENCOUNTER — HOSPITAL ENCOUNTER (EMERGENCY)
Facility: MEDICAL CENTER | Age: 28
End: 2023-05-04
Attending: EMERGENCY MEDICINE
Payer: MEDICAID

## 2023-05-04 ENCOUNTER — APPOINTMENT (OUTPATIENT)
Dept: RADIOLOGY | Facility: MEDICAL CENTER | Age: 28
End: 2023-05-04
Attending: EMERGENCY MEDICINE
Payer: MEDICAID

## 2023-05-04 VITALS
TEMPERATURE: 97.9 F | BODY MASS INDEX: 29.8 KG/M2 | WEIGHT: 185.41 LBS | OXYGEN SATURATION: 98 % | HEIGHT: 66 IN | SYSTOLIC BLOOD PRESSURE: 115 MMHG | RESPIRATION RATE: 15 BRPM | HEART RATE: 70 BPM | DIASTOLIC BLOOD PRESSURE: 66 MMHG

## 2023-05-04 DIAGNOSIS — O02.0 BLIGHTED OVUM: ICD-10-CM

## 2023-05-04 DIAGNOSIS — O20.9 VAGINAL BLEEDING IN PREGNANCY, FIRST TRIMESTER: ICD-10-CM

## 2023-05-04 LAB
ALBUMIN SERPL BCP-MCNC: 4.2 G/DL (ref 3.2–4.9)
ALBUMIN/GLOB SERPL: 1.8 G/DL
ALP SERPL-CCNC: 59 U/L (ref 30–99)
ALT SERPL-CCNC: 11 U/L (ref 2–50)
ANION GAP SERPL CALC-SCNC: 10 MMOL/L (ref 7–16)
APPEARANCE UR: CLEAR
AST SERPL-CCNC: 13 U/L (ref 12–45)
B-HCG SERPL-ACNC: 2035 MIU/ML (ref 0–5)
BACTERIA #/AREA URNS HPF: NEGATIVE /HPF
BASOPHILS # BLD AUTO: 0.6 % (ref 0–1.8)
BASOPHILS # BLD: 0.03 K/UL (ref 0–0.12)
BILIRUB SERPL-MCNC: 0.3 MG/DL (ref 0.1–1.5)
BILIRUB UR QL STRIP.AUTO: NEGATIVE
BUN SERPL-MCNC: 12 MG/DL (ref 8–22)
CALCIUM ALBUM COR SERPL-MCNC: 8.5 MG/DL (ref 8.5–10.5)
CALCIUM SERPL-MCNC: 8.7 MG/DL (ref 8.5–10.5)
CHLORIDE SERPL-SCNC: 106 MMOL/L (ref 96–112)
CO2 SERPL-SCNC: 24 MMOL/L (ref 20–33)
COLOR UR: YELLOW
CREAT SERPL-MCNC: 0.83 MG/DL (ref 0.5–1.4)
EOSINOPHIL # BLD AUTO: 0.22 K/UL (ref 0–0.51)
EOSINOPHIL NFR BLD: 4.2 % (ref 0–6.9)
EPI CELLS #/AREA URNS HPF: ABNORMAL /HPF
ERYTHROCYTE [DISTWIDTH] IN BLOOD BY AUTOMATED COUNT: 46.9 FL (ref 35.9–50)
GFR SERPLBLD CREATININE-BSD FMLA CKD-EPI: 100 ML/MIN/1.73 M 2
GLOBULIN SER CALC-MCNC: 2.4 G/DL (ref 1.9–3.5)
GLUCOSE SERPL-MCNC: 68 MG/DL (ref 65–99)
GLUCOSE UR STRIP.AUTO-MCNC: NEGATIVE MG/DL
HCT VFR BLD AUTO: 41.7 % (ref 37–47)
HGB BLD-MCNC: 13.8 G/DL (ref 12–16)
HYALINE CASTS #/AREA URNS LPF: ABNORMAL /LPF
IMM GRANULOCYTES # BLD AUTO: 0.01 K/UL (ref 0–0.11)
IMM GRANULOCYTES NFR BLD AUTO: 0.2 % (ref 0–0.9)
KETONES UR STRIP.AUTO-MCNC: NEGATIVE MG/DL
LEUKOCYTE ESTERASE UR QL STRIP.AUTO: NEGATIVE
LYMPHOCYTES # BLD AUTO: 2.27 K/UL (ref 1–4.8)
LYMPHOCYTES NFR BLD: 43.2 % (ref 22–41)
MCH RBC QN AUTO: 29.9 PG (ref 27–33)
MCHC RBC AUTO-ENTMCNC: 33.1 G/DL (ref 33.6–35)
MCV RBC AUTO: 90.3 FL (ref 81.4–97.8)
MICRO URNS: ABNORMAL
MONOCYTES # BLD AUTO: 0.35 K/UL (ref 0–0.85)
MONOCYTES NFR BLD AUTO: 6.7 % (ref 0–13.4)
NEUTROPHILS # BLD AUTO: 2.38 K/UL (ref 2–7.15)
NEUTROPHILS NFR BLD: 45.1 % (ref 44–72)
NITRITE UR QL STRIP.AUTO: NEGATIVE
NRBC # BLD AUTO: 0 K/UL
NRBC BLD-RTO: 0 /100 WBC
NUMBER OF RH DOSES IND 8505RD: NORMAL
PH UR STRIP.AUTO: 7 [PH] (ref 5–8)
PLATELET # BLD AUTO: 208 K/UL (ref 164–446)
PMV BLD AUTO: 9.2 FL (ref 9–12.9)
POTASSIUM SERPL-SCNC: 4.2 MMOL/L (ref 3.6–5.5)
PROT SERPL-MCNC: 6.6 G/DL (ref 6–8.2)
PROT UR QL STRIP: NEGATIVE MG/DL
RBC # BLD AUTO: 4.62 M/UL (ref 4.2–5.4)
RBC # URNS HPF: ABNORMAL /HPF
RBC UR QL AUTO: ABNORMAL
RH BLD: NORMAL
SODIUM SERPL-SCNC: 140 MMOL/L (ref 135–145)
SP GR UR STRIP.AUTO: 1.02
UROBILINOGEN UR STRIP.AUTO-MCNC: 1 MG/DL
WBC # BLD AUTO: 5.3 K/UL (ref 4.8–10.8)
WBC #/AREA URNS HPF: ABNORMAL /HPF

## 2023-05-04 PROCEDURE — 84702 CHORIONIC GONADOTROPIN TEST: CPT

## 2023-05-04 PROCEDURE — 76801 OB US < 14 WKS SINGLE FETUS: CPT

## 2023-05-04 PROCEDURE — 99284 EMERGENCY DEPT VISIT MOD MDM: CPT

## 2023-05-04 PROCEDURE — 86901 BLOOD TYPING SEROLOGIC RH(D): CPT

## 2023-05-04 PROCEDURE — 81001 URINALYSIS AUTO W/SCOPE: CPT

## 2023-05-04 PROCEDURE — 36415 COLL VENOUS BLD VENIPUNCTURE: CPT

## 2023-05-04 PROCEDURE — 80053 COMPREHEN METABOLIC PANEL: CPT

## 2023-05-04 PROCEDURE — 85025 COMPLETE CBC W/AUTO DIFF WBC: CPT

## 2023-05-04 ASSESSMENT — FIBROSIS 4 INDEX: FIB4 SCORE: 0.45

## 2023-05-04 NOTE — ED TRIAGE NOTES
Chief Complaint   Patient presents with    Vaginal Bleeding     6 weeks pregnant      Pt ambulate to triage with above complaint. Pt reports GLF 2 days ago, landed on side. Notes bleeding today, light but more than spotting.   Pt educated on triage process and returned to lobby.

## 2023-05-05 NOTE — ED PROVIDER NOTES
"ER Provider Note    Scribed for Dr. Angela Gutiérrez D.O. by Ted Barron. 5/4/2023  5:32 PM    Primary Care Provider: Sharmin Monaco M.D. (Inactive)    CHIEF COMPLAINT  Chief Complaint   Patient presents with    Vaginal Bleeding     6 weeks pregnant       EXTERNAL RECORDS REVIEWED  Inpatient Notes 04/26/2023 - ED visit - Threatened miscarriage    HPI/ROS  LIMITATION TO HISTORY   Select: : None    Monica Roman is a 28 y.o. female who presents to the ED for vaginal bleeding onset today. The patient states that she is 6 weeks pregnant. She also reports that the blood is bright red and present when she wipes. She reports that she suffered a ground-level fall 2 days ago at a friends house and landed on her side. She also states that she was seen at the ED on 04/26 after noticing vaginal spotting. She denies dysuria or fever. No alleviating or exacerbating factors noted. Patient has a past medical history of Asthma.    PAST MEDICAL HISTORY  Past Medical History:   Diagnosis Date    Asthma     childhood       SURGICAL HISTORY  Past Surgical History:   Procedure Laterality Date    DENTAL EXTRACTION(S)         FAMILY HISTORY  Family History   Problem Relation Age of Onset    Arthritis Mother     Autoimmune Disease Mother     Hypertension Mother        SOCIAL HISTORY   reports that she has never smoked. She has never used smokeless tobacco. She reports current alcohol use. She reports current drug use. Drug: Inhaled.    CURRENT MEDICATIONS  Previous Medications    ALBUTEROL 108 (90 BASE) MCG/ACT AERO SOLN INHALATION AEROSOL    Inhale 2 Puffs by mouth every 6 hours as needed for Shortness of Breath.    IBUPROFEN (MOTRIN) 800 MG TAB    Take 1 Tab by mouth every 8 hours as needed.       ALLERGIES  Patient has no known allergies.    PHYSICAL EXAM  /62   Pulse 79   Temp 36.2 °C (97.2 °F) (Temporal)   Resp 18   Ht 1.676 m (5' 6\")   Wt 84.1 kg (185 lb 6.5 oz)   LMP  (LMP Unknown)   SpO2 99%   BMI 29.93 kg/m² "   Constitutional: Patient is well developed, well nourished. Non-toxic appearing. No acute distress.   HENT: Normocephalic, atraumatic.  Moist oral mucosa.  Cardiovascular: Normal heart rate and Regular rhythm. No murmur,  Thorax & Lungs: Clear and equal breath sounds with good excursion. No respiratory distress, no rhonchi, wheezing or rales.  Abdomen: Bowel sounds normal in all four quadrants. Soft, pregnant, nontender, no rebound , guarding, palpable masses. No flank pain.  Skin: Warm, Dry, No contusions or abrasions.  Extremities: Peripheral pulses 4/4 No edema, No tenderness,   Musculoskeletal: Normal range of motion in all major joints. No tenderness to palpation or major deformities noted.  Neurologic: Alert & oriented x 3, Normal motor function, Normal sensory function,  Psychiatric: Affect normal, Judgment normal, Mood normal.     DIAGNOSTIC STUDIES & PROCEDURES    Labs:   Results for orders placed or performed during the hospital encounter of 05/04/23   CBC WITH DIFFERENTIAL   Result Value Ref Range    WBC 5.3 4.8 - 10.8 K/uL    RBC 4.62 4.20 - 5.40 M/uL    Hemoglobin 13.8 12.0 - 16.0 g/dL    Hematocrit 41.7 37.0 - 47.0 %    MCV 90.3 81.4 - 97.8 fL    MCH 29.9 27.0 - 33.0 pg    MCHC 33.1 (L) 33.6 - 35.0 g/dL    RDW 46.9 35.9 - 50.0 fL    Platelet Count 208 164 - 446 K/uL    MPV 9.2 9.0 - 12.9 fL    Neutrophils-Polys 45.10 44.00 - 72.00 %    Lymphocytes 43.20 (H) 22.00 - 41.00 %    Monocytes 6.70 0.00 - 13.40 %    Eosinophils 4.20 0.00 - 6.90 %    Basophils 0.60 0.00 - 1.80 %    Immature Granulocytes 0.20 0.00 - 0.90 %    Nucleated RBC 0.00 /100 WBC    Neutrophils (Absolute) 2.38 2.00 - 7.15 K/uL    Lymphs (Absolute) 2.27 1.00 - 4.80 K/uL    Monos (Absolute) 0.35 0.00 - 0.85 K/uL    Eos (Absolute) 0.22 0.00 - 0.51 K/uL    Baso (Absolute) 0.03 0.00 - 0.12 K/uL    Immature Granulocytes (abs) 0.01 0.00 - 0.11 K/uL    NRBC (Absolute) 0.00 K/uL   COMP METABOLIC PANEL   Result Value Ref Range    Sodium 140 135 -  145 mmol/L    Potassium 4.2 3.6 - 5.5 mmol/L    Chloride 106 96 - 112 mmol/L    Co2 24 20 - 33 mmol/L    Anion Gap 10.0 7.0 - 16.0    Glucose 68 65 - 99 mg/dL    Bun 12 8 - 22 mg/dL    Creatinine 0.83 0.50 - 1.40 mg/dL    Calcium 8.7 8.5 - 10.5 mg/dL    AST(SGOT) 13 12 - 45 U/L    ALT(SGPT) 11 2 - 50 U/L    Alkaline Phosphatase 59 30 - 99 U/L    Total Bilirubin 0.3 0.1 - 1.5 mg/dL    Albumin 4.2 3.2 - 4.9 g/dL    Total Protein 6.6 6.0 - 8.2 g/dL    Globulin 2.4 1.9 - 3.5 g/dL    A-G Ratio 1.8 g/dL   HCG QUANTITATIVE   Result Value Ref Range    Bhcg 2035.0 (H) 0.0 - 5.0 mIU/mL   URINALYSIS,CULTURE IF INDICATED    Specimen: Urine, Clean Catch   Result Value Ref Range    Color Yellow     Character Clear     Specific Gravity 1.024 <1.035    Ph 7.0 5.0 - 8.0    Glucose Negative Negative mg/dL    Ketones Negative Negative mg/dL    Protein Negative Negative mg/dL    Bilirubin Negative Negative    Urobilinogen, Urine 1.0 Negative    Nitrite Negative Negative    Leukocyte Esterase Negative Negative    Occult Blood Trace (A) Negative    Micro Urine Req Microscopic    RH TYPE FOR RHOGAM FROM E.D.   Result Value Ref Range    Emergency Department Rh Typing POS     Number Of Rh Doses Indicated ZERO    CORRECTED CALCIUM   Result Value Ref Range    Correct Calcium 8.5 8.5 - 10.5 mg/dL   ESTIMATED GFR   Result Value Ref Range    GFR (CKD-EPI) 100 >60 mL/min/1.73 m 2   URINE MICROSCOPIC (W/UA)   Result Value Ref Range    WBC 2-5 /hpf    RBC 2-5 (A) /hpf    Bacteria Negative None /hpf    Epithelial Cells Many (A) /hpf    Hyaline Cast 0-2 /lpf     All labs reviewed by me.    Radiology:   The attending Emergency Physician has independently interpreted the diagnostic imaging associated with this visit and is awaiting the final reading from the radiologist, which will be displayed below.    Preliminary interpretation is a follows: No fetal heartbeat  Radiologist interpretation:    US-OB 1ST TRIMESTER WITH TRANSVAGINAL (COMBO)   Final  Result      1. Cystic structure identified in the endometrial cavity without significant change in size or appearance with no evidence of fetal pole or yolk sac suggesting blighted ovum.      2.  Without visualization of yolk sac or fetal pole, pseudocyst sac of ectopic cannot entirely be excluded.      3.  No adnexal mass or pelvic free fluid identified.           COURSE & MEDICAL DECISION MAKING    ED Observation Status? Yes; I am placing the patient in to an observation status due to a diagnostic uncertainty as well as therapeutic intensity. Patient placed in observation status at 5:36 PM, 5/4/2023.     Observation plan is as follows: Monitor for symptom management and diagnostic results    Upon Reevaluation, the patient's condition has: Improved; and will be discharged.    Patient discharged from ED Observation status at 9:00 PM (Time) 05/04/2023 (Date).     INITIAL ASSESSMENT AND PLAN  Care Narrative:       5:32 PM - Patient seen and evaluated at bedside. Discussed plan of care, including lab studies to evaluate the cause of her bleeding. Patient agrees to plan of care. Ordered Rh Type for Rhogam, CBC w diff, CMP, HCG Quantitative, and UA with culture if indicated to evaluate. Differential diagnoses include but are not limited to: Threatened vs complete miscarriage.    8:44 PM - Patient was reevaluated at bedside. Discussed lab and radiology results with the patient and informed her of the results that show a miscarriage. Patient had the opportunity to ask any questions.  I expressed to the patient that this is not anything she did or did not do.  She is to rest, increase fluids, continue multivitamins and try not to get pregnant for the next 2 to 3 months until she has is 3 normal cycles.  She is to return if bleeding 1 maxi pad per hour, fever greater than 101 or uncontrolled pain.  I recommended still following up with her OB/GYN for further care.  She is discharged in stable and improved condition.                  DISPOSITION AND DISCUSSIONS  Discussion of management with other [facility] or appropriate source(s): Radiologist        Barriers to care at this time, including but not limited to:  None .     The patient will return for new or worsening symptoms and is stable at the time of discharge.    The patient is referred to OB for further evaluation and care.    DISPOSITION:  Patient will be discharged home in stable condition.    FOLLOW UP:  [facility]  [address] [place] [id number]  [phone]    Call   As needed    FINAL IMPRESSION   1. Vaginal bleeding in pregnancy, first trimester    2. Blighted ovum      Ted MACK (Esther), am scribing for, and in the presence of, Angela Gutiérrez D.O..    Electronically signed by: Ted Barron (Esther), 5/4/2023    Angela MACK D.O. personally performed the services described in this documentation, as scribed by Ted Barron in my presence, and it is both accurate and complete.    The note accurately reflects work and decisions made by me.  Angela Gutiérrez D.O.  5/5/2023  12:32 AM

## 2023-05-05 NOTE — DISCHARGE INSTRUCTIONS
Your pregnancy is not viable and you will eventually miscarry.  Expect to have more vaginal bleeding almost like irregular menstrual cycle being very heavy at first and then tapering off.  You may experience some cramping so you can take ibuprofen as needed.  I would follow-up with your OB/gynecologist within the next week.  If you experience fever greater than 101, uncontrolled bleeding i.e. 1 maxi pad per hour or uncontrolled pain please return to the emergency department.  Wait at least 2 to 3 months before attempting pregnancy again so that you can get your cycles regular.  Take multivitamins, rest, increase fluids.

## 2024-03-27 ENCOUNTER — NON-PROVIDER VISIT (OUTPATIENT)
Dept: OCCUPATIONAL MEDICINE | Facility: CLINIC | Age: 29
End: 2024-03-27

## 2024-03-27 DIAGNOSIS — Z02.1 PRE-EMPLOYMENT DRUG SCREENING: ICD-10-CM

## 2024-04-03 ENCOUNTER — EH NON-PROVIDER (OUTPATIENT)
Dept: OCCUPATIONAL MEDICINE | Facility: CLINIC | Age: 29
End: 2024-04-03

## 2024-04-03 DIAGNOSIS — Z02.83 ENCOUNTER FOR DRUG SCREENING: ICD-10-CM

## 2024-04-03 PROCEDURE — 8911 PR MRO FEE: Performed by: NURSE PRACTITIONER

## 2024-11-19 ENCOUNTER — HOSPITAL ENCOUNTER (EMERGENCY)
Facility: MEDICAL CENTER | Age: 29
End: 2024-11-19
Attending: STUDENT IN AN ORGANIZED HEALTH CARE EDUCATION/TRAINING PROGRAM
Payer: MEDICAID

## 2024-11-19 ENCOUNTER — PHARMACY VISIT (OUTPATIENT)
Dept: PHARMACY | Facility: MEDICAL CENTER | Age: 29
End: 2024-11-19
Payer: COMMERCIAL

## 2024-11-19 VITALS
TEMPERATURE: 97.8 F | SYSTOLIC BLOOD PRESSURE: 109 MMHG | RESPIRATION RATE: 19 BRPM | HEIGHT: 66 IN | HEART RATE: 77 BPM | DIASTOLIC BLOOD PRESSURE: 70 MMHG | BODY MASS INDEX: 34.72 KG/M2 | WEIGHT: 216 LBS | OXYGEN SATURATION: 100 %

## 2024-11-19 DIAGNOSIS — H10.32 ACUTE CONJUNCTIVITIS OF LEFT EYE, UNSPECIFIED ACUTE CONJUNCTIVITIS TYPE: ICD-10-CM

## 2024-11-19 PROCEDURE — 700101 HCHG RX REV CODE 250: Mod: UD | Performed by: STUDENT IN AN ORGANIZED HEALTH CARE EDUCATION/TRAINING PROGRAM

## 2024-11-19 PROCEDURE — RXMED WILLOW AMBULATORY MEDICATION CHARGE: Performed by: STUDENT IN AN ORGANIZED HEALTH CARE EDUCATION/TRAINING PROGRAM

## 2024-11-19 PROCEDURE — 99283 EMERGENCY DEPT VISIT LOW MDM: CPT

## 2024-11-19 RX ORDER — PROPARACAINE HYDROCHLORIDE 5 MG/ML
1 SOLUTION/ DROPS OPHTHALMIC ONCE
Status: COMPLETED | OUTPATIENT
Start: 2024-11-19 | End: 2024-11-19

## 2024-11-19 RX ORDER — ERYTHROMYCIN 5 MG/G
1 OINTMENT OPHTHALMIC 4 TIMES DAILY
Qty: 3.5 G | Refills: 0 | Status: ACTIVE | OUTPATIENT
Start: 2024-11-19 | End: 2024-11-26

## 2024-11-19 RX ADMIN — FLUORESCEIN SODIUM 1 MG: 1 STRIP OPHTHALMIC at 05:15

## 2024-11-19 RX ADMIN — PROPARACAINE HYDROCHLORIDE 1 DROP: 5 SOLUTION/ DROPS OPHTHALMIC at 05:15

## 2024-11-19 ASSESSMENT — FIBROSIS 4 INDEX: FIB4 SCORE: 0.51

## 2024-11-19 NOTE — ED TRIAGE NOTES
Chief Complaint   Patient presents with    Eye Pain       Pt to triage ambulatory for above complaint. Presents with left eye pain; noted redness. States started 2 days ago and gotten worse. Denies any discharges; no injury or accident.    AOX4 GCS15    Pt back to lobby, educated on triage process and encourage to alert staff of any changes.     Vitals:    11/19/24 0402   BP: 109/70   Pulse: 77   Resp: 19   Temp: 36.6 °C (97.8 °F)   SpO2: 100%

## 2024-11-19 NOTE — ED PROVIDER NOTES
ER Provider Note    Scribed for Fam Bean D.o. by Roger Akbar. 11/19/2024  4:45 AM    Primary Care Provider: Sharmin Monaco M.D. (Inactive)    CHIEF COMPLAINT   Chief Complaint   Patient presents with    Eye Pain     EXTERNAL RECORDS REVIEWED  Inpatient Notes The patient was seen on 8/19/23 for right forearm laceration.     HPI/ROS  LIMITATION TO HISTORY   Select: : None  OUTSIDE HISTORIAN(S):  Sister was present at bedside.    Monica Roman is a 29 y.o. female who presents to the ED complaining of eye pain.  The patient reports she has been experiencing left sided eye irration for 3 days. She expresses concerns that symptoms may be due to eye lash glue getting into her eye. She reports associated symptoms of headaches. Denies any fevers or vomiting. The patient does not wear contacts.  Denies trauma.  No additional pain or symptoms noted at this time.     PAST MEDICAL HISTORY  Past Medical History:   Diagnosis Date    Asthma     childhood       SURGICAL HISTORY  Past Surgical History:   Procedure Laterality Date    DENTAL EXTRACTION(S)         FAMILY HISTORY  Family History   Problem Relation Age of Onset    Arthritis Mother     Autoimmune Disease Mother     Hypertension Mother        SOCIAL HISTORY   reports that she has never smoked. She has never used smokeless tobacco. She reports current alcohol use. She reports current drug use. Drug: Inhaled.    CURRENT MEDICATIONS  Discharge Medication List as of 11/19/2024  5:16 AM        CONTINUE these medications which have NOT CHANGED    Details   ibuprofen (MOTRIN) 800 MG Tab Take 1 Tab by mouth every 8 hours as needed., Disp-30 Tab,R-0, Normal      albuterol 108 (90 Base) MCG/ACT Aero Soln inhalation aerosol Inhale 2 Puffs by mouth every 6 hours as needed for Shortness of Breath., Disp-8.5 g, R-0, Normal             ALLERGIES  Patient has no known allergies.    PHYSICAL EXAM  /70   Pulse 77   Temp 36.6 °C (97.8 °F) (Temporal)    "Resp 19   Ht 1.676 m (5' 6\")   Wt 98 kg (216 lb)   SpO2 100%   BMI 34.86 kg/m²   Constitutional: Alert in no apparent distress.  HENT: No signs of trauma, Bilateral external ears normal, Nose normal.   Eyes: Pupils are equal and reactive, Normal ocular movements, conjunctival injection of left eye, no fluorescent intake, no eyelid swelling, no periorbital swelling or erythema,  Left eye pressure of 14.   Neck: Normal range of motion, No tenderness, Supple, No stridor.   Lymphatic: No lymphadenopathy noted.   Cardiovascular: Regular rate and rhythm, no murmurs.   Thorax & Lungs: Normal breath sounds, No respiratory distress, No wheezing, No chest tenderness.   Abdomen: Bowel sounds normal, Soft, No tenderness, No masses, No pulsatile masses.   Skin: Warm, Dry, No erythema, No rash.   Back: No bony tenderness, No CVA tenderness.   Extremities: Intact distal pulses, No edema, No tenderness, No cyanosis  Musculoskeletal: Good range of motion in all major joints. No tenderness to palpation or major deformities noted.   Neurologic: Alert , Normal motor function, Normal sensory function, No focal deficits noted.       COURSE & MEDICAL DECISION MAKING     ASSESSMENT, COURSE AND PLAN  Care Narrative: Patient presenting with left-sided eye redness and irritation.  Visual acuity slightly decreased in left compared to the right.  Patient has no signs of trauma.  Patient has normal ocular movements, no painful eye movements no periorbital swelling or erythema.  Low suspicion for orbital cellulitis.  Patient does have chemosis no clear signs of foreign body.  Normal intraocular pressure low suspicion for acute angle-closure glaucoma.  There is no signs of corneal abrasion, ulcer or globe defect including Gracie sign.  Patient does not have any systemic symptoms or changes in her right eye that would suggest underlying inflammatory disease or iritis. Discussed with patient supportive care at home discontinuation of make-up, " eyelash adhesives and trial of antibiotic ointment.  Discussed with patient primary care follow-up as well as return precautions.        DISPOSITION AND DISCUSSIONS  I have discussed management of the patient with the following physicians and ELIESER's:  None    Discussion of management with other [facility] or appropriate source(s): None     Escalation of care considered, and ultimately not performed: Laboratory analysis and acute inpatient care management, however at this time, the patient is most appropriate for outpatient management.    Barriers to care at this time, including but not limited to:  None .     Decision tools and prescription drugs considered including, but not limited to: Antibiotics antibiotic ointment .    The patient will return for new or worsening symptoms and is stable at the time of discharge.    DISPOSITION:  Patient will be discharged home in stable condition.    FOLLOW UP:  [address] [id number] [phone]          OUTPATIENT MEDICATIONS:  Discharge Medication List as of 11/19/2024  5:16 AM        START taking these medications    Details   erythromycin 5 MG/GM Ointment Apply 1 Application to left eye 4 times a day for 7 days., Disp-3.5 g, R-0, Normal              FINAL DIANGOSIS  1. Acute conjunctivitis of left eye, unspecified acute conjunctivitis type      I, Roger Akbar (Esther), am scribing for, and in the presence of, Fam Bean D.O    Electronically signed by: Roger Akbar (Esther), 11/19/2024    IFam D.O personally performed the services described in this documentation, as scribed by Roger Akbar in my presence, and it is both accurate and complete.      The note accurately reflects work and decisions made by me.  Fam Bean D.O.  11/19/2024  7:00 AM

## 2024-11-19 NOTE — DISCHARGE INSTRUCTIONS
We have given you prescription for antibiotic ointment which you should use as directed.  If you have uncontrolled pain, fever, vision changes please return to the emergency room.  We have given you contact information for a primary care clinic where you can follow-up.  Until your symptoms resolved please do not use any sort of make-up, adhesives or artificial lashes.

## 2025-02-28 ENCOUNTER — HOSPITAL ENCOUNTER (EMERGENCY)
Facility: MEDICAL CENTER | Age: 30
End: 2025-02-28
Attending: EMERGENCY MEDICINE
Payer: MEDICAID

## 2025-02-28 VITALS
HEART RATE: 76 BPM | TEMPERATURE: 97.5 F | SYSTOLIC BLOOD PRESSURE: 122 MMHG | BODY MASS INDEX: 34.91 KG/M2 | OXYGEN SATURATION: 98 % | WEIGHT: 216.27 LBS | RESPIRATION RATE: 14 BRPM | DIASTOLIC BLOOD PRESSURE: 71 MMHG

## 2025-02-28 DIAGNOSIS — Z3A.01 LESS THAN 8 WEEKS GESTATION OF PREGNANCY: ICD-10-CM

## 2025-02-28 LAB
B-HCG SERPL-ACNC: 3028 MIU/ML (ref 0–5)
T PALLIDUM AB SER QL IA: NORMAL

## 2025-02-28 PROCEDURE — 84702 CHORIONIC GONADOTROPIN TEST: CPT

## 2025-02-28 PROCEDURE — 86780 TREPONEMA PALLIDUM: CPT

## 2025-02-28 PROCEDURE — 99283 EMERGENCY DEPT VISIT LOW MDM: CPT

## 2025-02-28 PROCEDURE — 36415 COLL VENOUS BLD VENIPUNCTURE: CPT

## 2025-02-28 ASSESSMENT — FIBROSIS 4 INDEX: FIB4 SCORE: 0.51

## 2025-02-28 NOTE — ED TRIAGE NOTES
Pt to triage .  Chief Complaint   Patient presents with    Pregnancy     Pt states she had a positive home pregnancy test and wants confirmation of pregnancy. Pt denies abd pain or vaginal bleeding

## 2025-03-01 NOTE — ED PROVIDER NOTES
ED PHYSICIAN NOTE    CHIEF COMPLAINT  Chief Complaint   Patient presents with    Pregnancy     Pt states she had a positive home pregnancy test and wants confirmation of pregnancy. Pt denies abd pain or vaginal bleeding        HPI/ROS      Monica Ford Daksha Roman is a 29 y.o. female who presents to the emergency department to confirm pregnancy.  LMP was 1/27.  Took a pregnancy test that was positive.  She wants to confirm.  She denies abdominal pain or bleeding.  No fevers.  No dizziness lightheadedness.  No prenatal care as of yet.    PAST MEDICAL HISTORY  Past Medical History:   Diagnosis Date    Asthma     childhood       SOCIAL HISTORY  Social History     Tobacco Use    Smoking status: Never    Smokeless tobacco: Never   Vaping Use    Vaping status: Never Used   Substance Use Topics    Alcohol use: Yes     Comment: Occasionally    Drug use: Yes     Types: Inhaled     Comment: THC daily        CURRENT MEDICATIONS  Home Medications       Reviewed by Sandrita Martínez R.N. (Registered Nurse) on 02/28/25 at 1444  Med List Status: Partial     Medication Last Dose Status   albuterol 108 (90 Base) MCG/ACT Aero Soln inhalation aerosol  Active   ibuprofen (MOTRIN) 800 MG Tab  Active                    ALLERGIES  No Known Allergies    PHYSICAL EXAM  VITAL SIGNS: /71   Pulse 76   Temp 36.4 °C (97.5 °F) (Temporal)   Resp 14   Wt 98.1 kg (216 lb 4.3 oz)   LMP 01/28/2025   SpO2 98%   BMI 34.91 kg/m²    Constitutional: Awake and alert  HENT: Normal inspection  Eyes: Normal inspection  Neck: Grossly normal range of motion.  Cardiovascular: Normal heart rate  Thorax & Lungs: No respiratory distress  Extremities: Well perfused  Neurologic: Grossly normal   Psychiatric: Normal for situation      DIAGNOSTIC STUDIES / PROCEDURES  LABS/EKG  Results for orders placed or performed during the hospital encounter of 02/28/25   BETA-HCG QUANTITATIVE SERUM    Collection Time: 02/28/25  4:23 PM   Result Value Ref Range    Bhcg  3028.0 (H) 0.0 - 5.0 mIU/mL   T.PALLIDUM AB NOBLE (SCREENING)    Collection Time: 25  4:23 PM   Result Value Ref Range    Syphilis, Treponemal Qual Non-Reactive Non-Reactive           COURSE & MEDICAL DECISION MAKING      INITIAL ASSESSMENT, COURSE AND PLAN  Care Narrative: Patient presents to confirm pregnancy.   A1.  About 4 weeks pregnant by dates.  She has no symptoms.  Ordered hCG.  Also ordered mandated syphilis test.  No indication for emergency ultrasound or additional blood work given the lack of symptoms.    hCG 3000.  Patient was referred to the [facility].  Given standard instructions for pregnancy.        DISPOSITION AND DISCUSSIONS    Escalation of care considered, and ultimately not performed:diagnostic imaging    FINAL IMPRESSION  1.  Early pregnancy    This dictation was created using voice recognition software. The accuracy of the dictation is limited to the abilities of the software. I expect there may be some errors of grammar and possibly content. The nursing notes were reviewed and certain aspects of this information were incorporated into this note.    Electronically signed by: Ranjeet Sparks M.D., 2025

## 2025-03-01 NOTE — DISCHARGE INSTRUCTIONS
Please follow-up at the [facility].    Return to the ER for abdominal pain, bleeding or concerning symptoms.

## 2025-03-27 ENCOUNTER — ANCILLARY PROCEDURE (OUTPATIENT)
Dept: OBGYN | Facility: CLINIC | Age: 30
End: 2025-03-27
Payer: MEDICAID

## 2025-03-27 DIAGNOSIS — N91.2 AMENORRHEA: ICD-10-CM

## 2025-03-31 ENCOUNTER — INITIAL PRENATAL (OUTPATIENT)
Dept: OBGYN | Facility: CLINIC | Age: 30
End: 2025-03-31
Payer: MEDICAID

## 2025-03-31 ENCOUNTER — HOSPITAL ENCOUNTER (OUTPATIENT)
Facility: MEDICAL CENTER | Age: 30
End: 2025-03-31
Payer: MEDICAID

## 2025-03-31 ENCOUNTER — APPOINTMENT (OUTPATIENT)
Dept: OBGYN | Facility: CLINIC | Age: 30
End: 2025-03-31
Payer: MEDICAID

## 2025-03-31 VITALS — SYSTOLIC BLOOD PRESSURE: 116 MMHG | WEIGHT: 218 LBS | BODY MASS INDEX: 35.19 KG/M2 | DIASTOLIC BLOOD PRESSURE: 70 MMHG

## 2025-03-31 DIAGNOSIS — J45.20 MILD INTERMITTENT ASTHMA WITHOUT COMPLICATION: Chronic | ICD-10-CM

## 2025-03-31 DIAGNOSIS — Z34.81 PRENATAL CARE, SUBSEQUENT PREGNANCY, FIRST TRIMESTER: ICD-10-CM

## 2025-03-31 DIAGNOSIS — F41.9 ANXIETY AND DEPRESSION: ICD-10-CM

## 2025-03-31 DIAGNOSIS — F32.A ANXIETY AND DEPRESSION: ICD-10-CM

## 2025-03-31 PROCEDURE — 87510 GARDNER VAG DNA DIR PROBE: CPT

## 2025-03-31 PROCEDURE — 87491 CHLMYD TRACH DNA AMP PROBE: CPT

## 2025-03-31 PROCEDURE — 87591 N.GONORRHOEAE DNA AMP PROB: CPT

## 2025-03-31 PROCEDURE — 87660 TRICHOMONAS VAGIN DIR PROBE: CPT

## 2025-03-31 PROCEDURE — 87480 CANDIDA DNA DIR PROBE: CPT

## 2025-03-31 PROCEDURE — 88142 CYTOPATH C/V THIN LAYER: CPT

## 2025-03-31 ASSESSMENT — EDINBURGH POSTNATAL DEPRESSION SCALE (EPDS)
I HAVE BEEN SO UNHAPPY THAT I HAVE HAD DIFFICULTY SLEEPING: NOT AT ALL
I HAVE FELT SCARED OR PANICKY FOR NO GOOD REASON: NO, NOT AT ALL
TOTAL SCORE: 9
I HAVE BEEN ANXIOUS OR WORRIED FOR NO GOOD REASON: YES, SOMETIMES
I HAVE LOOKED FORWARD WITH ENJOYMENT TO THINGS: RATHER LESS THAN I USED TO
I HAVE FELT SAD OR MISERABLE: YES, QUITE OFTEN
THINGS HAVE BEEN GETTING ON TOP OF ME: NO, MOST OF THE TIME I HAVE COPED QUITE WELL
I HAVE BLAMED MYSELF UNNECESSARILY WHEN THINGS WENT WRONG: NOT VERY OFTEN
THE THOUGHT OF HARMING MYSELF HAS OCCURRED TO ME: NEVER
I HAVE BEEN ABLE TO LAUGH AND SEE THE FUNNY SIDE OF THINGS: NOT QUITE SO MUCH NOW
I HAVE BEEN SO UNHAPPY THAT I HAVE BEEN CRYING: ONLY OCCASIONALLY

## 2025-03-31 ASSESSMENT — FIBROSIS 4 INDEX: FIB4 SCORE: 0.53

## 2025-03-31 NOTE — Clinical Note
REFERRAL APPROVAL NOTICE         Sent on March 31, 2025                   Monica Roman  [address]  [address]  [place] [address]                   Dear Ms. Roman,    After a careful review of the medical information and benefit coverage, [facility] has processed your referral. See below for additional details.    If applicable, you must be actively enrolled with your insurance for coverage of the authorized service. If you have any questions regarding your coverage, please contact your insurance directly.    REFERRAL INFORMATION   Referral #:  [id number]  Referred-To Department    Referred-By Provider:  OB/Gyn    PANKAJ Neri   [facility]      [address]  [place] [id number] [phone] [address] [place] [address]  [place] [id number] [phone]    Referral Start Date:  03/31/2025  Referral End Date:   03/31/2026             SCHEDULING  If you do not already have an appointment, please call [phone] to make an appointment.     MORE INFORMATION  If you do not already have a [employer] account, sign up at: [email or web address]  You can access your medical information, make appointments, see lab results, billing information, and more.  If you have questions regarding this referral, please contact  the [facility] Referrals department at:             [phone]. Monday - Friday 8:00AM - 5:00PM.     Sincerely,    [facility]

## 2025-03-31 NOTE — PROGRESS NOTES
Pt here for NOB apt.   Pt states : no questions or concerns   (OB Prenatal Package Plan) No  Last pap : doesn't remember when last one was. Will do one today    LMP : 01/26/2025  KEL : 11/02/25 LMP  US : 03/27/25  GA today : 9w 1d LMP  EPDS : 9  Pt is not interested in genetic testing ( both NIPT and AFP )  Phone : [phone]  Pharm verified.   Vaginal Symptoms : none

## 2025-03-31 NOTE — PROGRESS NOTES
Risk factors:   depression   Referrals made today:   none     Subjective:   Monica Roman is a 30 y.o.  who presents for her new OB exam.  She is 9w1d with an KEL of Estimated Date of Delivery: 25 by LMP c/w 8w4d US.   She is feeling well and has no concerns at this time. She reports one ER visit on 25 for confirmation of pregnancy and had Hcg of 3028.0. Reports no fetal movement.     Vaginal bleeding denies  Pain   denies  Cramping  denies  History of thyroid disease denies  History of high blood pressure denies  History of uterine surgery denies    Pre-eclampsia risk factors:   1 of the following: No/denies Previous pregnancy with pre-eclampsia, Type 1 or 2 diabetes, Chronic hypertension, Multifetal gestation, Antiphospholipid syndrome, and Systemic Lupus erythematosus  2 of the following: nulliparity, , and Low income    Diabetes risk factors:   One of the following: No/denies GDM previous pregnancy, Previous prediabetes, HIV infection, and Greater than 36 yo    BMI greater than or equal to 25 and one of the following: No/denies First degree relative with diabetes, High risk ethnicity, History of CVD, History HTN, History hyperlipidemia, PCOS, Physical inactivity, and Severe obesity    FOB is involved. His name is Dick.    Pregnancy is planned and desired.    She is currently working as a homemaker.   Denies alcohol use, drug use, or tobacco use in pregnancy.     Denies any current or hx of sexual, emotional or physical abuse or trauma.     Current Medications: PNV     Allergies: NKDA     Past Medical History:   Diagnosis Date    Asthma     childhood       OB History    Para Term  AB Living   2    1    SAB IAB Ectopic Molar Multiple Live Births   1           # Outcome Date GA Lbr Anil/2nd Weight Sex Type Anes PTL Lv   2 Current            1 SAB  3w0d               Past Surgical History:   Procedure Laterality Date    DENTAL EXTRACTION(S)           Gynecological History  STIs  Chlamydia, trichomoniasis - past, treated    HSV  Denies  Abnormal pap Denies     Psych History   Depression and anxiety: Currently seeing , was previously on Bupropion but  stopped once she learned she was pregnant. Feels mood is stable without medications at this time.   Bipolar   Denies   Postpartum Mood Changes N/A       Family History   Problem Relation Age of Onset    Arthritis Mother     Autoimmune Disease Mother     Hypertension Mother     No Known Problems Father     No Known Problems Maternal Grandmother     No Known Problems Maternal Grandfather     No Known Problems Paternal Grandmother     No Known Problems Paternal Grandfather      No known genetic disorders in family history.     Objective:      Vitals:    03/31/25 0912   BP: 116/70   Weight: 218 lb        Physical Exam  Constitutional:       Appearance: Normal appearance. She is obese.   HENT:      Head: Normocephalic and atraumatic.   Eyes:      Extraocular Movements: Extraocular movements intact.      Conjunctiva/sclera: Conjunctivae normal.   Cardiovascular:      Rate and Rhythm: Normal rate and regular rhythm.      Heart sounds: Normal heart sounds.   Pulmonary:      Effort: Pulmonary effort is normal.      Breath sounds: Normal breath sounds.   Abdominal:      General: Bowel sounds are normal.      Palpations: Abdomen is soft.   Genitourinary:     General: Normal vulva.   Musculoskeletal:      Cervical back: Normal range of motion.   Skin:     General: Skin is warm and dry.   Neurological:      Mental Status: She is alert and oriented to person, place, and time.   Psychiatric:         Mood and Affect: Mood normal.         Behavior: Behavior normal.         Thought Content: Thought content normal.         Judgment: Judgment normal.         EPDS: 9    See Prenatal Flowsheet for vitals    A/P:     Problem List Items Addressed This Visit       Asthma (Chronic)    Prenatal care, subsequent pregnancy, first trimester     Relevant Orders    US-OB 2ND 3RD TRI COMPLETE    URINE DRUG SCREEN W/CONF (AR)    PREG CNTR PRENATAL PN    PANORAMA PRENATAL TEST    THINPREP RFLX HPV ASCUS W/CTNG    VAGINAL PATHOGENS DNA PANEL    Centering Pregnancy    Anxiety and depression        P:  1.  Vaginal pathogen collected. Pap collected.         2.  Prenatal labs and UDS ordered - lab slip given        3.  Pt is a candidate for ASA 81mg for preeclampsia prevention. Recommended daily from 12 until 36 weeks, pt agrees. Will start next visit.         4.  Pt is interested in genetic testing. NIPT ordered, instructed to wait until next week to complete.         5.  Discussed diet and exercise during pregnancy. Encouraged good nutrition, and daily exercise including walking or swimming. Discussed expected weight gain during pregnancy.              6.  Discussed adequate hydration during pregnancy.        7.  NOB packet given        8.  Return to office in 4 wks        9.  Complete OB US in 10-11 wks      10.  Pregnancy guide given      11.  Childbirth education discussed. She is a candidate for Centering Pregnancy. Referral sent     12.  Depression and anxiety - continue to see  provider. Offered rx for antidepressant, pt declines at this time; will work with psychologist if she desires medication management in the future.     LONNY Neri.

## 2025-04-01 ENCOUNTER — TELEPHONE (OUTPATIENT)
Dept: OBGYN | Facility: CLINIC | Age: 30
End: 2025-04-01

## 2025-04-01 LAB
C TRACH DNA GENITAL QL NAA+PROBE: NEGATIVE
CANDIDA DNA VAG QL PROBE+SIG AMP: NEGATIVE
G VAGINALIS DNA VAG QL PROBE+SIG AMP: POSITIVE
N GONORRHOEA DNA GENITAL QL NAA+PROBE: NEGATIVE
SPECIMEN SOURCE: NORMAL
T VAGINALIS DNA VAG QL PROBE+SIG AMP: NEGATIVE

## 2025-04-01 NOTE — TELEPHONE ENCOUNTER
Pt called with concern about her positive BV result. She would like to know what the next step would be.

## 2025-04-03 ENCOUNTER — RESULTS FOLLOW-UP (OUTPATIENT)
Dept: OBGYN | Facility: CLINIC | Age: 30
End: 2025-04-03

## 2025-04-04 LAB — THINPREP PAP, CYTOLOGY NL11781: NORMAL

## 2025-04-09 ENCOUNTER — HOSPITAL ENCOUNTER (OUTPATIENT)
Dept: LAB | Facility: MEDICAL CENTER | Age: 30
End: 2025-04-09
Payer: MEDICAID

## 2025-04-09 PROCEDURE — 36415 COLL VENOUS BLD VENIPUNCTURE: CPT

## 2025-04-16 ENCOUNTER — RESULTS FOLLOW-UP (OUTPATIENT)
Dept: OBGYN | Facility: CLINIC | Age: 30
End: 2025-04-16

## 2025-04-16 LAB
Lab: NORMAL
NTRA 1P36 DELETION SYNDROME POPULATION-BASED RISK TEXT: NORMAL
NTRA 1P36 DELETION SYNDROME RESULT TEXT: NORMAL
NTRA 1P36 DELETION SYNDROME RISK SCORE TEXT: NORMAL
NTRA 22Q11.2 DELETION SYNDROME POPULATION-BASED RISK TEXT: NORMAL
NTRA 22Q11.2 DELETION SYNDROME RESULT TEXT: NORMAL
NTRA 22Q11.2 DELETION SYNDROME RISK SCORE TEXT: NORMAL
NTRA ANGELMAN SYNDROME POPULATION-BASED RISK TEXT: NORMAL
NTRA ANGELMAN SYNDROME RESULT TEXT: NORMAL
NTRA ANGELMAN SYNDROME RISK SCORE TEXT: NORMAL
NTRA CRI-DU-CHAT SYNDROME POPULATION-BASED RISK TEXT: NORMAL
NTRA CRI-DU-CHAT SYNDROME RESULT TEXT: NORMAL
NTRA CRI-DU-CHAT SYNDROME RISK SCORE TEXT: NORMAL
NTRA FETAL FRACTION: NORMAL
NTRA GENDER OF FETUS: NORMAL
NTRA MONOSOMY X AGE-BASED RISK TEXT: NORMAL
NTRA MONOSOMY X RESULT TEXT: NORMAL
NTRA MONOSOMY X RISK SCORE TEXT: NORMAL
NTRA PRADER-WILLI SYNDROME POPULATION-BASED RISK TEXT: NORMAL
NTRA PRADER-WILLI SYNDROME RESULT TEXT: NORMAL
NTRA PRADER-WILLI SYNDROME RISK SCORE TEXT: NORMAL
NTRA TRIPLOIDY RESULT TEXT: NORMAL
NTRA TRISOMY 13 AGE-BASED RISK TEXT: NORMAL
NTRA TRISOMY 13 RESULT TEXT: NORMAL
NTRA TRISOMY 13 RISK SCORE TEXT: NORMAL
NTRA TRISOMY 18 AGE-BASED RISK TEXT: NORMAL
NTRA TRISOMY 18 RESULT TEXT: NORMAL
NTRA TRISOMY 18 RISK SCORE TEXT: NORMAL
NTRA TRISOMY 21 AGE-BASED RISK TEXT: NORMAL
NTRA TRISOMY 21 RESULT TEXT: NORMAL
NTRA TRISOMY 21 RISK SCORE TEXT: NORMAL

## 2025-04-26 NOTE — PROGRESS NOTES
S: Pt is a 30 y.o.  at 13w1d gestation here today for routine prenatal care.     Concerns today include:   not having much appetite but otherwise feeling well. Is seeing  provider, has restarted Wellbutrin and Abilify. Reports there was a lab error and PNLs were not drawn with NIPT.      Denies: vaginal bleeding, pelvic and abdominal pain, cramping, contractions, leaking of fluid, urinary and vaginal symptoms    Pt reports fetal movement as Absent      O: /82   Wt 216 lb   LMP 2025 (Approximate)   BMI 34.86 kg/m²    *see prenatal flowsheet*     Labs:     Prenatal labs: Needs to be completed.  GTT: not due   GBS: Not yet collected  Genetic testing: NIPT low risk   STI testing: GC/CT negative    Ultrasound: none since last visit     A/P:     Problem List Items Addressed This Visit       Prenatal care, subsequent pregnancy, first trimester    Discuss SAB precautions.  Address concerns: lack of appetite. Discussed normalcy during early pregnancy, may be side effect of restarting medications. Encouraged healthy eating when feeling appetite.  Encouraged NOB labs with MSAFP next week.  Pap WNL, repeat in 3 years.    Anatomy scan scheduled 25.  ASA 81mg for preeclampsia prevention ordered.  Discuss nutrition and exercise  RTC 4 wks           Relevant Medications    aspirin 81 MG EC tablet    Other Relevant Orders    AFP MATERNAL SERUM ALPHA-FETOPROTEIN

## 2025-04-28 ENCOUNTER — ROUTINE PRENATAL (OUTPATIENT)
Dept: OBGYN | Facility: CLINIC | Age: 30
End: 2025-04-28
Payer: MEDICAID

## 2025-04-28 VITALS — BODY MASS INDEX: 34.86 KG/M2 | DIASTOLIC BLOOD PRESSURE: 82 MMHG | SYSTOLIC BLOOD PRESSURE: 116 MMHG | WEIGHT: 216 LBS

## 2025-04-28 DIAGNOSIS — Z34.81 PRENATAL CARE, SUBSEQUENT PREGNANCY, FIRST TRIMESTER: ICD-10-CM

## 2025-04-28 RX ORDER — BUPROPION HYDROCHLORIDE 150 MG/1
150 TABLET, EXTENDED RELEASE ORAL 2 TIMES DAILY
COMMUNITY

## 2025-04-28 RX ORDER — ASPIRIN 81 MG/1
81 TABLET ORAL DAILY
Qty: 100 TABLET | Refills: 2 | Status: SHIPPED | OUTPATIENT
Start: 2025-04-28

## 2025-04-28 RX ORDER — ARIPIPRAZOLE 10 MG/1
10 TABLET ORAL DAILY
COMMUNITY

## 2025-04-28 ASSESSMENT — FIBROSIS 4 INDEX: FIB4 SCORE: 0.53

## 2025-04-29 NOTE — ASSESSMENT & PLAN NOTE
Discuss SAB precautions.  Address concerns: lack of appetite. Discussed normalcy during early pregnancy, may be side effect of restarting medications. Encouraged healthy eating when feeling appetite.  Encouraged NOB labs with MSAFP next week.  Pap WNL, repeat in 3 years.    Anatomy scan scheduled 6/16/25.  ASA 81mg for preeclampsia prevention ordered.  Discuss nutrition and exercise  RTC 4 wks

## 2025-05-19 ENCOUNTER — HOSPITAL ENCOUNTER (OUTPATIENT)
Dept: LAB | Facility: MEDICAL CENTER | Age: 30
End: 2025-05-19
Payer: MEDICAID

## 2025-05-19 DIAGNOSIS — Z34.81 PRENATAL CARE, SUBSEQUENT PREGNANCY, FIRST TRIMESTER: ICD-10-CM

## 2025-05-19 LAB
ABO GROUP BLD: NORMAL
BLD GP AB SCN SERPL QL: NORMAL
RH BLD: NORMAL

## 2025-05-19 PROCEDURE — 36415 COLL VENOUS BLD VENIPUNCTURE: CPT

## 2025-05-19 PROCEDURE — 86850 RBC ANTIBODY SCREEN: CPT

## 2025-05-19 PROCEDURE — 86803 HEPATITIS C AB TEST: CPT

## 2025-05-19 PROCEDURE — 85027 COMPLETE CBC AUTOMATED: CPT

## 2025-05-19 PROCEDURE — 87340 HEPATITIS B SURFACE AG IA: CPT

## 2025-05-19 PROCEDURE — 86762 RUBELLA ANTIBODY: CPT

## 2025-05-19 PROCEDURE — 86900 BLOOD TYPING SEROLOGIC ABO: CPT

## 2025-05-19 PROCEDURE — 86901 BLOOD TYPING SEROLOGIC RH(D): CPT

## 2025-05-19 PROCEDURE — 86780 TREPONEMA PALLIDUM: CPT

## 2025-05-19 PROCEDURE — 82105 ALPHA-FETOPROTEIN SERUM: CPT

## 2025-05-19 PROCEDURE — 87389 HIV-1 AG W/HIV-1&-2 AB AG IA: CPT

## 2025-05-20 LAB
ERYTHROCYTE [DISTWIDTH] IN BLOOD BY AUTOMATED COUNT: 45.2 FL (ref 35.9–50)
HBV SURFACE AG SER QL: ABNORMAL
HCT VFR BLD AUTO: 36.7 % (ref 37–47)
HCV AB SER QL: ABNORMAL
HGB BLD-MCNC: 12 G/DL (ref 12–16)
HIV 1+2 AB+HIV1 P24 AG SERPL QL IA: NORMAL
MCH RBC QN AUTO: 28.6 PG (ref 27–33)
MCHC RBC AUTO-ENTMCNC: 32.7 G/DL (ref 32.2–35.5)
MCV RBC AUTO: 87.6 FL (ref 81.4–97.8)
PLATELET # BLD AUTO: 240 K/UL (ref 164–446)
PMV BLD AUTO: 9.6 FL (ref 9–12.9)
RBC # BLD AUTO: 4.19 M/UL (ref 4.2–5.4)
T PALLIDUM AB SER QL IA: ABNORMAL
WBC # BLD AUTO: 6.1 K/UL (ref 4.8–10.8)

## 2025-05-22 LAB
# FETUSES US: NORMAL
AFP MOM SERPL: 1.85
AFP SERPL-MCNC: 50 NG/ML
AGE - REPORTED: 28.6 YR
CURRENT SMOKER: NO
FAMILY MEMBER DISEASES HX: NO
GA METHOD: NORMAL
GA: NORMAL WK
IDDM PATIENT QL: NO
INTEGRATED SCN PATIENT-IMP: NORMAL
SPECIMEN DRAWN SERPL: NORMAL

## 2025-05-28 LAB — RUBV IGG SER-ACNC: 12.9 IU/ML

## 2025-06-02 ENCOUNTER — APPOINTMENT (OUTPATIENT)
Dept: OBGYN | Facility: CLINIC | Age: 30
End: 2025-06-02
Payer: MEDICAID

## 2025-06-02 VITALS — BODY MASS INDEX: 34.38 KG/M2 | WEIGHT: 213 LBS | DIASTOLIC BLOOD PRESSURE: 68 MMHG | SYSTOLIC BLOOD PRESSURE: 108 MMHG

## 2025-06-02 DIAGNOSIS — Z34.82 PRENATAL CARE, SUBSEQUENT PREGNANCY, SECOND TRIMESTER: Primary | ICD-10-CM

## 2025-06-02 PROCEDURE — 3078F DIAST BP <80 MM HG: CPT

## 2025-06-02 PROCEDURE — 3074F SYST BP LT 130 MM HG: CPT

## 2025-06-02 PROCEDURE — 0502F SUBSEQUENT PRENATAL CARE: CPT

## 2025-06-02 NOTE — PROGRESS NOTES
Pt here for a OB follow up   GA: 18w 1d   Pt states: no questions   Possible fetal movement.  No VB, LOF, UC's.  Phone # [phone]  Pharmacy Verified.  Ultrasound : 06/16  Labs : done

## 2025-06-02 NOTE — PROGRESS NOTES
S: Pt is a 30 y.o.  at 18w1d gestation here today for routine prenatal care.     Concerns today include:  Denies concerns, doing well overall and mood is stable.     Denies: vaginal bleeding, pelvic and abdominal pain, cramping, contractions, leaking of fluid, urinary and vaginal symptoms    Pt reports fetal movement as Present - flutters      O: /68   Wt 213 lb   LMP 2025 (Approximate)   BMI 34.38 kg/m²    *see prenatal flowsheet*     Labs:     Prenatal labs: Completed and normal  GTT: not due    GBS: Not yet collected  Genetic testing: NIPT low risk   STI testing: negative    Ultrasound: none since last visit     A/P:     Problem List Items Addressed This Visit       Prenatal care, subsequent pregnancy, second trimester - Primary    Pt is a 30 y.o.  at 18w1d gestation here today for routine prenatal care.     Discuss 2nd trimester warning signs  Address concerns: denies concerns   MSAFP declined   NIPT performed earlier this pregnancy.  Anatomy scan scheduled.   RTC 4 wks

## 2025-06-03 NOTE — ASSESSMENT & PLAN NOTE
Pt is a 30 y.o.  at 18w1d gestation here today for routine prenatal care.     Discuss 2nd trimester warning signs  Address concerns: denies concerns   MSAFP declined   NIPT performed earlier this pregnancy.  Anatomy scan scheduled.   RTC 4 wks

## 2025-06-16 ENCOUNTER — ANCILLARY PROCEDURE (OUTPATIENT)
Dept: OBGYN | Facility: CLINIC | Age: 30
End: 2025-06-16
Payer: MEDICAID

## 2025-06-16 VITALS
SYSTOLIC BLOOD PRESSURE: 119 MMHG | WEIGHT: 213.4 LBS | HEART RATE: 88 BPM | DIASTOLIC BLOOD PRESSURE: 58 MMHG | BODY MASS INDEX: 34.3 KG/M2 | HEIGHT: 66 IN

## 2025-06-16 DIAGNOSIS — Z34.81 PRENATAL CARE, SUBSEQUENT PREGNANCY, FIRST TRIMESTER: ICD-10-CM

## 2025-06-16 PROCEDURE — 76805 OB US >/= 14 WKS SNGL FETUS: CPT | Performed by: OBSTETRICS & GYNECOLOGY

## 2025-06-25 ENCOUNTER — ROUTINE PRENATAL (OUTPATIENT)
Dept: OBGYN | Facility: CLINIC | Age: 30
End: 2025-06-25
Payer: MEDICAID

## 2025-06-25 VITALS — SYSTOLIC BLOOD PRESSURE: 100 MMHG | DIASTOLIC BLOOD PRESSURE: 66 MMHG | WEIGHT: 217 LBS | BODY MASS INDEX: 35.56 KG/M2

## 2025-06-25 DIAGNOSIS — Z34.82 PRENATAL CARE, SUBSEQUENT PREGNANCY, SECOND TRIMESTER: Primary | ICD-10-CM

## 2025-06-25 PROCEDURE — 0502F SUBSEQUENT PRENATAL CARE: CPT

## 2025-06-25 PROCEDURE — 3078F DIAST BP <80 MM HG: CPT

## 2025-06-25 PROCEDURE — 3074F SYST BP LT 130 MM HG: CPT

## 2025-06-25 NOTE — PROGRESS NOTES
Pt here for a OB follow up   GA: 21w 3d    Pt states: no questions   + fetal movement.  No VB, LOF, UC's.  Phone # [phone]  Pharmacy Verified.  Ultrasound : 06/16  Labs : done

## 2025-06-25 NOTE — ASSESSMENT & PLAN NOTE
.Pt is a 30 y.o.  at 21w3d gestation here today for routine prenatal care.   Size equal to dates    Discuss 2nd trimester warning signs  Address concerns: denies concerns   Marijuana use during pregnancy - discussed abstinence again today per ACOG guidelines. Pt planning on quitting.   MSAFP and NIPT performed earlier this pregnancy.  Anatomy scan reviewed. Findings: WNL. Growth US ordered.   RTC 4 wks

## 2025-06-25 NOTE — PROGRESS NOTES
"S: Pt is a 30 y.o.  at 21w3d gestation here today for routine prenatal care.     Concerns today include:  Denies concerns today. States she feels well overall, feels mood is \"fine\".     Denies: vaginal bleeding, pelvic and abdominal pain, cramping, contractions, leaking of fluid, urinary and vaginal symptoms    Pt reports fetal movement as Present     O: /66   Wt 217 lb   LMP 2025 (Approximate)   BMI 35.56 kg/m²    *see prenatal flowsheet*     Labs:     Prenatal labs: Completed and normal  GTT: not due     GBS: Not yet collected  Genetic testing: NIPT low risk, MSAFP screen negative    STI testing: negative    Ultrasound: US-OB 2ND 3RD TRI COMPLETE  Narrative: Indication  ===========    Indication:  Screening for Malformation  Comment: Low risk NIPT  Indication: Routine screening for abnormality. Fetal anatomy survey  Method  =======    Transabdominal ultrasound examination. View: Sufficient  Pregnancy  ==========    Nagy pregnancy. Number of fetuses: 1  Dating  =======    LMP on: 2025  GA by LMP 20 w + 1 d  KEL by LMP: 2025  GA by prior assessment 20 w + 1 d  KEL by prior assessment: 2025  Ultrasound examination on: 2025  GA by U/S based upon: AC, BPD, Femur, HC  GA by U/S 21 w + 1 d  KEL by U/S: 10/26/2025  Assigned: based on stated KEL, selected on 2025  Assigned GA 20 w + 1 d  Assigned KEL: 2025  General Evaluation  ===================    Cardiac activity present.  bpm. Fetal movements: visualized. Presentation: breech  Placenta: Placental site: posterior, high  Umbilical cord: 3 vessel cord  Amniotic fluid: normal amount  Fetal Biometry  ===============    Standard  BPD 47.2 mm 20w 2d 55% Hadlock  OFD 61.6 mm 21w 1d 83% Frantz  .0 mm 20w 1d 40% Hadlock  Cerebellum tr 22.1 mm  94% [place]  Nuchal fold 4.7 mm  .8 mm 21w 4d 87% Hadlock  Femur 38.2 mm 22w 2d 95% Hadlock  Humerus 35.4 mm 22w 2d 98% Frantz  HC / AC 1.06  3% " Hadlock   g 21w 4d 99% Hadlock  EFW (lb) 1 lb  EFW (oz) 0 oz  EFW by: Hadlock (BPD-HC-AC-FL)  Extended   5.4 mm  CM 4.7 mm  40% Nicolaides  Nasal bone 6.7 mm  Head / Face / Neck  Cephalic index 0.77  26% Nicolaides  Nasal bone: present  Extremities / Bony Struc  FL / BPD 0.81  >99% Hadlock  FL / HC 0.22  >99% Hadlock  FL / AC 0.23  82% Hadlock  Other Structures   bpm  Fetal Anatomy  ==============    Cranium: normal  Lateral ventricles: normal  Choroid plexus: normal  Midline falx: normal  Cavum septi pellucidi: normal  Cerebellum: normal  Cisterna magna: normal  Head / Neck  Head size: normal  Head shape: normal  Rt lateral ventricle: normal  Lt lateral ventricle: normal  Rt choroid plexus: normal  Lt choroid plexus: normal  Parenchyma: normal  [place]: normal  Thalami: normal  Third ventricle: normal  Fourth ventricle: normal  Cerebellar lobes: normal  Vermis: normal  Neck: normal  Nuchal fold: normal  Lips: normal  Profile: normal  Nose: normal  Face  Coronal face: normal  Nasal bone: present  Orbits: normal  Lens: normal  4-chamber view: normal  RVOT view: normal  LVOT view: normal  3-vessel view: normal  3-vessel-trachea view: normal  Heart / Thorax  Situs: situs solitus (normal)  Aortic arch view: normal  Bicaval view: normal  Ductal arch view: normal  SVC: normal  IVC: normal  Interventricular septum: normal  Great vessels: normal  Cardiac position: levocardia (normal)  Cardiac axis: normal  Cardiac size: normal (approx. 1/3 of thoracic area)  Cardiac rhythm: regular (normal)  Cardiac function: good contractility (normal)  Rt lung: normal  Lt lung: normal  Diaphragm: normal  Trachea: normal  Ribs: normal  Cord insertion: normal  Stomach: normal  Kidneys: normal  Bladder: normal  Genitals: normal  Abdomen  Abdom. wall: normal  Rt kidney: normal  Lt kidney: normal  Liver: normal  Gallbladder: normal  Spleen: normal  Small bowel: normal  Large bowel: normal  Rt renal artery: normal  Lt  renal artery: normal  Cervical spine: normal  Thoracic spine: normal  Lumbar spine: normal  Sacral spine: normal  Arms: normal  Hands: normal  Legs: normal  Feet: normal  Rt arm: normal  Lt arm: normal  Rt hand: normal  Lt hand: normal  Rt leg: normal  Lt leg: normal  Rt foot: normal  Lt foot: normal  Skeleton: normal  Position of hands: normal  Position of feet: normal  Fetal sex: female  Wants to know fetal sex: yes  Maternal Structures  ====================    Uterus / Cervix  Cervix: Visualized  Cervix details: normal  Approach: Transabdominal  Cervical length 35.5 mm  Ovaries / Tubes / Adnexa  Rt ovary: Not visualized  Lt ovary: Not visualized  Impression: Impression  ===========    She endorses that she smokes a couple joints of marijuana per day. She purchases her supply from marijuana stores. I shared with  her that marijuana on the street may have a risk of being laced with more potent drugs. I also shared that THC does not cross the  placenta in significant amount and is not shown to be associated with consequences to the fetus fetus. She endorses that she is  prepared to give up smoking marijuana and that she and her partner do not smoke cigarettes.  She tested low risk with NIPT as well as second trimester maternal serum screen.  I would recommend drug screen from time to time and particularly when admitted in labor and warning the pediatrician.    Arnulfo Solares MD  [facility], [facility]  [phone]       A/P:     Problem List Items Addressed This Visit       Prenatal care, subsequent pregnancy, second trimester - Primary    .Pt is a 30 y.o.  at 21w3d gestation here today for routine prenatal care.   Size equal to dates    Discuss 2nd trimester warning signs  Address concerns: denies concerns   Marijuana use during pregnancy - discussed abstinence again today per ACOG guidelines. Pt planning on quitting.   MSAFP and NIPT performed earlier this pregnancy.  Anatomy scan reviewed. Findings: WNL. Growth  US ordered.   RTC 4 wks           Relevant Orders    US-OB LIMITED GROWTH FOLLOW UP    '

## 2025-07-07 ENCOUNTER — HOSPITAL ENCOUNTER (EMERGENCY)
Facility: MEDICAL CENTER | Age: 30
End: 2025-07-07
Attending: OBSTETRICS & GYNECOLOGY | Admitting: OBSTETRICS & GYNECOLOGY
Payer: MEDICAID

## 2025-07-07 VITALS
RESPIRATION RATE: 18 BRPM | WEIGHT: 217.15 LBS | SYSTOLIC BLOOD PRESSURE: 110 MMHG | HEART RATE: 89 BPM | OXYGEN SATURATION: 100 % | BODY MASS INDEX: 35.59 KG/M2 | TEMPERATURE: 97 F | DIASTOLIC BLOOD PRESSURE: 62 MMHG

## 2025-07-07 LAB
APPEARANCE UR: ABNORMAL
BILIRUB UR QL STRIP.AUTO: NEGATIVE
CANDIDA DNA VAG QL PROBE+SIG AMP: NEGATIVE
COLOR UR AUTO: ABNORMAL
G VAGINALIS DNA VAG QL PROBE+SIG AMP: POSITIVE
GLUCOSE UR STRIP.AUTO-MCNC: NEGATIVE MG/DL
KETONES UR STRIP.AUTO-MCNC: NEGATIVE MG/DL
LEUKOCYTE ESTERASE UR QL STRIP.AUTO: NEGATIVE
NITRITE UR QL STRIP.AUTO: NEGATIVE
PH UR: 6 [PH] (ref 5–8)
PROT UR QL STRIP: 30 MG/DL
RBC UR QL AUTO: NEGATIVE
SP GR UR STRIP.AUTO: 1.02 (ref 1–1.03)
T VAGINALIS DNA VAG QL PROBE+SIG AMP: NEGATIVE
UROBILINOGEN UR STRIP.AUTO-MCNC: 0.2 E.U./DL

## 2025-07-07 PROCEDURE — 87660 TRICHOMONAS VAGIN DIR PROBE: CPT

## 2025-07-07 PROCEDURE — 87480 CANDIDA DNA DIR PROBE: CPT

## 2025-07-07 PROCEDURE — 81003 URINALYSIS AUTO W/O SCOPE: CPT | Mod: QW | Performed by: OBSTETRICS & GYNECOLOGY

## 2025-07-07 PROCEDURE — 99282 EMERGENCY DEPT VISIT SF MDM: CPT

## 2025-07-07 PROCEDURE — 87510 GARDNER VAG DNA DIR PROBE: CPT

## 2025-07-07 RX ORDER — METRONIDAZOLE 500 MG/1
500 TABLET ORAL 2 TIMES DAILY
Qty: 14 TABLET | Refills: 0 | Status: SHIPPED | OUTPATIENT
Start: 2025-07-07 | End: 2025-07-14

## 2025-07-07 ASSESSMENT — ENCOUNTER SYMPTOMS
CHILLS: 0
FEVER: 0

## 2025-07-07 ASSESSMENT — PAIN SCALES - GENERAL: PAINLEVEL: 4
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
## 2025-07-07 NOTE — PROGRESS NOTES
"0115-  at 23.1 weeks presenting to labor and delivery with complaint of cramping and \"abnormal\" fetal movement. Patient denies VB, HA, vision changes, LOF, contractions. Patient states she has +FM. TOCO applied and heart tones dopplered, VS taken-see flowsheets.    0131- Report given to Castro MOE, orders received.     0319- Report given to Castro MOE, discharge orders received.     0343-  labor precautions reviewed, patient verbalized understanding, all questions answered at this time. Discharge paperwork signed by patient. Patient dressed in personal clothing and ambulation off unit in stable condition. All personal belongings in patients possession.   "

## 2025-07-10 ENCOUNTER — HOSPITAL ENCOUNTER (EMERGENCY)
Facility: MEDICAL CENTER | Age: 30
End: 2025-07-10
Attending: EMERGENCY MEDICINE
Payer: MEDICAID

## 2025-07-10 VITALS
WEIGHT: 215.83 LBS | OXYGEN SATURATION: 96 % | HEIGHT: 65 IN | HEART RATE: 90 BPM | DIASTOLIC BLOOD PRESSURE: 65 MMHG | SYSTOLIC BLOOD PRESSURE: 106 MMHG | BODY MASS INDEX: 35.96 KG/M2 | RESPIRATION RATE: 18 BRPM | TEMPERATURE: 97.4 F

## 2025-07-10 DIAGNOSIS — R51.0 ORTHOSTATIC HEADACHE: Primary | ICD-10-CM

## 2025-07-10 PROCEDURE — 99282 EMERGENCY DEPT VISIT SF MDM: CPT

## 2025-07-10 NOTE — PROGRESS NOTES
This RN at bedside. Pt denies LOF, vaginal bleeding or contractions. Pt reports +fetal movement that is possibly decreased over the last day. Pt denies vision changes, RUQ pain, or new onset edema. Reports HA x3 days that is not resolved with medication. Pt normotensive and EDMD doing w/u at this time.     1015- Report called to Dr Lizarraga including all s/s. Orders received.     1019- Primary RN notified of reassuring doppler. Pt and RN instructed to contact L&D with any OB concerns

## 2025-07-10 NOTE — ED PROVIDER NOTES
ED Provider Note    Scribed for Dr. Ruiz by Vini Kline. 7/10/2025,  10:02 AM.    CHIEF COMPLAINT  Chief Complaint   Patient presents with    Headache     X 3 days      EXTERNAL RECORDS REVIEWED  Outpatient Notes patient seen on labor and delivery 2025, diagnosed with bacterial vaginosis    HPI  LIMITATION TO HISTORY   Select: : None  OUTSIDE HISTORIAN(S):  None    Monica Roman is a 30 y.o. female who is  23 weeks gestation without complications who presents to the Emergency Department for an intermittent diffuse headache onset 3 days ago. The patient explains that she suddenly developed a headache 3 days ago which will present with sudden movements such as when standing up. This has since persisted, so the patient decided to call her OB who advised presentation to the ED. She denies a history of similar headaches and no associated symptoms are reported. She denies any associated vaginal discharge or recent fevers/illness. The patient has been taking tylenol and drinking water which has provided no alleviation. The headache is not present when the patient lays down and she has thus been sleeping for the past 3 days. Endorses normal ambulation. The patient denies medical problems or history of spinal procedure.    REVIEW OF SYSTEMS  See HPI for further details. All other systems are negative.     PAST MEDICAL HISTORY  Past Medical History:   Diagnosis Date    Asthma     childhood     SURGICAL HISTORY  Past Surgical History:   Procedure Laterality Date    DENTAL EXTRACTION(S)       FAMILY HISTORY  Family History   Problem Relation Age of Onset    Arthritis Mother     Autoimmune Disease Mother     Hypertension Mother     No Known Problems Father     No Known Problems Maternal Grandmother     No Known Problems Maternal Grandfather     No Known Problems Paternal Grandmother     No Known Problems Paternal Grandfather      SOCIAL HISTORY    reports that she has never smoked. She has never used  "smokeless tobacco. She reports that she does not currently use alcohol. She reports that she does not currently use drugs after having used the following drugs: Inhaled and Marijuana.    ALLERGIES  No Known Allergies    PHYSICAL EXAM  VITAL SIGNS: /62   Pulse 85   Temp 36.3 °C (97.4 °F) (Temporal)   Resp 18   Ht 1.651 m (5' 5\")   Wt 97.9 kg (215 lb 13.3 oz)   LMP 01/26/2025 (Approximate)   SpO2 98%   BMI 35.92 kg/m²   Gen: Alert, no acute distress  HEENT: ATNC  Eyes: PERRL, EOMI, normal conjunctiva  Neck: trachea midline. No meningismus   Resp: no respiratory distress  CV: No JVD, regular rate and rhythm  Abd: non-distended, Abdomen is soft and non tender.   Ext: No deformities  Neuro: speech fluent, Cranial nerves II-XII intact. Normal coordination, +1 patellar reflex bilaterally     COURSE & MEDICAL DECISION MAKING  Pertinent Labs & Imaging studies were reviewed. (See chart for details)  -----------    10:02 AM Patient seen and examined at bedside. Explained to the patient that she is likely having an orthostatic headache and I discussed plan for referral to neurology for follow up. I discussed plan for discharge and follow up as outlined below. Patient was provided with an opportunity to ask questions.The patient is stable for discharge at this time and will return for any new or worsening symptoms. Patient verbalizes understanding and support with my plan for discharge.     INITIAL ASSESSMENT AND PLAN  Medical Decision Making: Patient presents with what sounds like a headache only with rapidly standing.  She denies any current complaints.  Cranial nerves, remainder of neurologic exam is reassuring.  She has no hypertension, no hyperreflexia, no pedal edema, no signs of very early preeclampsia.  She demonstrates no abnormalities that would suggest intracranial bleed, intracranial mass.  Low suspicion for venous sinus thrombosis.  No hypertension to suggest hypertensive encephalopathy.  At this point " in time, the patient remains asymptomatic.  We tried a number of different provocative maneuvers to reproduce the patient's headache to see if there was a component of possible idiopathic intracranial hypotension, however the patient remains asymptomatic.  She will be referred to neurology for follow-up in case her headaches persist, was given return precautions, and is stable for discharge home at this time    ADDITIONAL PROBLEM LIST AND DISPOSITION      I have discussed management of the patient with the following medical professionals:  None    Escalation of care considered, and ultimately not performed: Laboratory analysis and diagnostic imaging.       The patient will return for new or worsening symptoms and is stable at the time of discharge.      DISPOSITION:  Patient will be discharged home in stable condition.    FOLLOW UP:  [facility]  [address] [place] [address], [address]  [facility] [address] [phone]    If your headache does not improve    [facility], Emergency Dept  [address] [facility] [address] [phone]    If symptoms worsen    FINAL IMPRESSION  1. Orthostatic headache       I, Vini Kline (Scribe), am scribing for, and in the presence of, Abdi Ruiz M.D..    Electronically signed by: Vini Kline (Scribe), 7/10/2025    I, Abdi Ruiz M.D. personally performed the services described in this documentation, as scribed by Vini Kline in my presence, and it is both accurate and complete.    The note accurately reflects work and decisions made by me.  Abdi Ruiz M.D.  7/10/2025  1:46 PM      This dictation was created using voice recognition software. The accuracy of the dictation is limited to the abilities of the software. I expect there may be some errors of grammar and possibly content. The nursing notes were reviewed and certain aspects of this information were incorporated into this note.

## 2025-07-10 NOTE — DISCHARGE INSTRUCTIONS
You were seen in the ED today for a headache. Your physical exam was reassuring and your headache did not return despite provocative maneuvers.  The exact cause of your headache is not clear at this time but does not appear to be dangerous.    Please follow-up with your OB/GYN.  You are being referred to neurology for follow-up if you have ongoing headaches.    Please drink plenty of fluids.    Return to the ED if you develop a headache with a fever, neck stiffness, severe persistent headache, or new neurologic symptoms, such as numbness or tingling.

## 2025-07-10 NOTE — ED TRIAGE NOTES
"Chief Complaint   Patient presents with    Headache     X 3 days      Pt states that she has had a headache for 3 days and was told by her OB to come in. Patient is 23 weeks and 4 days pregnant. . Pt denies hx of headaches pt as taken tylenol at home with no relief. Pt Ax0x4, ambulatory to triage, educated on wait time and triage process.       BP 99/59   Pulse 81   Temp 36.3 °C (97.4 °F) (Temporal)   Resp 16   Ht 1.651 m (5' 5\")   Wt 97.9 kg (215 lb 13.3 oz)   LMP 2025 (Approximate)   SpO2 98%   BMI 35.92 kg/m²     "

## 2025-07-10 NOTE — ED NOTES
Written and verbal instructions provided to pt. Pt instructed to follow up with PCP Pt instructed to return to emergency department for new or worsening symptoms. Pt verbalized understanding of discharge instructions. Pt ambulatory upon discharge with all belongings.

## 2025-07-31 ENCOUNTER — ROUTINE PRENATAL (OUTPATIENT)
Dept: OBGYN | Facility: CLINIC | Age: 30
End: 2025-07-31
Payer: MEDICAID

## 2025-07-31 VITALS — BODY MASS INDEX: 35.61 KG/M2 | SYSTOLIC BLOOD PRESSURE: 104 MMHG | WEIGHT: 214 LBS | DIASTOLIC BLOOD PRESSURE: 60 MMHG

## 2025-07-31 DIAGNOSIS — Z34.82 PRENATAL CARE, SUBSEQUENT PREGNANCY, SECOND TRIMESTER: Primary | ICD-10-CM

## 2025-07-31 NOTE — PROGRESS NOTES
S: Pt is a 30 y.o.  at 26w4d gestation here today for routine prenatal care. Repots doing well.     Concerns today include:  Denies concerns    Denies: vaginal bleeding, pelvic and abdominal pain, cramping, contractions, leaking of fluid, urinary and vaginal symptoms, HA, vision changes, RUQ pain    Pt reports fetal movement as Present     O: /60   Wt 214 lb   LMP 2025 (Approximate)   BMI 35.61 kg/m²    *see prenatal flowsheet*     Labs:     Prenatal labs: Completed and normal  GCT: not done yet   GBS: Not yet collected  Genetic testing: NIPT low risk, MSAFP screen negative  STI testing: negative    Ultrasound: anatomy scan WNL    A/P:     Problem List Items Addressed This Visit          Women's Health Problems    Prenatal care, subsequent pregnancy, second trimester - Primary    Pt is a 30 y.o.  at 26w4d gestation here today for routine prenatal care.   Size equal to dates    Warning s/s for this gestational age reviewed  Address concerns: no concerns  Anticipatory guidance for third trimester labs given  Fetal movement expectations given, provided anticipatory guidance for FKCs starting at 28 weeks   Growth U/S scheduled for   RTC 2 wks          Relevant Orders    GLUCOSE 1HR GESTATIONAL    CBC WITHOUT DIFFERENTIAL    T.PALLIDUM AB NOBLE (SCREENING)         Yara Raphael CNM

## 2025-07-31 NOTE — PROGRESS NOTES
Pt here today for OB follow up  Pt states no complaints   Reports +FM   Good # [phone]  Pharmacy Confirmed.  Chaperone offered and not indicated.   Pt has growth scheduled on 9/2/2025

## 2025-07-31 NOTE — ASSESSMENT & PLAN NOTE
Pt is a 30 y.o.  at 26w4d gestation here today for routine prenatal care.   Size equal to dates    Warning s/s for this gestational age reviewed  Address concerns: no concerns  Anticipatory guidance for third trimester labs given  Fetal movement expectations given, provided anticipatory guidance for FKCs starting at 28 weeks   Growth U/S scheduled for   RTC 2 wks

## 2025-08-14 ENCOUNTER — TELEPHONE (OUTPATIENT)
Dept: HEALTH INFORMATION MANAGEMENT | Facility: OTHER | Age: 30
End: 2025-08-14
Payer: MEDICAID

## 2025-08-20 ENCOUNTER — HOSPITAL ENCOUNTER (OUTPATIENT)
Dept: LAB | Facility: MEDICAL CENTER | Age: 30
End: 2025-08-20
Attending: ADVANCED PRACTICE MIDWIFE
Payer: MEDICAID

## 2025-08-20 ENCOUNTER — ROUTINE PRENATAL (OUTPATIENT)
Dept: OBGYN | Facility: CLINIC | Age: 30
End: 2025-08-20
Payer: MEDICAID

## 2025-08-20 VITALS — SYSTOLIC BLOOD PRESSURE: 116 MMHG | BODY MASS INDEX: 35.94 KG/M2 | DIASTOLIC BLOOD PRESSURE: 72 MMHG | WEIGHT: 216 LBS

## 2025-08-20 DIAGNOSIS — Z34.82 PRENATAL CARE, SUBSEQUENT PREGNANCY, SECOND TRIMESTER: Primary | ICD-10-CM

## 2025-08-20 DIAGNOSIS — Z34.82 PRENATAL CARE, SUBSEQUENT PREGNANCY, SECOND TRIMESTER: ICD-10-CM

## 2025-08-20 LAB
ERYTHROCYTE [DISTWIDTH] IN BLOOD BY AUTOMATED COUNT: 42.9 FL (ref 35.9–50)
GLUCOSE 1H P 50 G GLC PO SERPL-MCNC: 73 MG/DL (ref 70–139)
HCT VFR BLD AUTO: 35.5 % (ref 37–47)
HGB BLD-MCNC: 12.1 G/DL (ref 12–16)
MCH RBC QN AUTO: 28.9 PG (ref 27–33)
MCHC RBC AUTO-ENTMCNC: 34.1 G/DL (ref 32.2–35.5)
MCV RBC AUTO: 84.7 FL (ref 81.4–97.8)
PLATELET # BLD AUTO: 214 K/UL (ref 164–446)
PMV BLD AUTO: 10.5 FL (ref 9–12.9)
RBC # BLD AUTO: 4.19 M/UL (ref 4.2–5.4)
T PALLIDUM AB SER QL IA: NORMAL
WBC # BLD AUTO: 7.1 K/UL (ref 4.8–10.8)

## 2025-08-20 PROCEDURE — 86780 TREPONEMA PALLIDUM: CPT

## 2025-08-20 PROCEDURE — 3078F DIAST BP <80 MM HG: CPT | Performed by: OBSTETRICS & GYNECOLOGY

## 2025-08-20 PROCEDURE — 36415 COLL VENOUS BLD VENIPUNCTURE: CPT

## 2025-08-20 PROCEDURE — 3074F SYST BP LT 130 MM HG: CPT | Performed by: OBSTETRICS & GYNECOLOGY

## 2025-08-20 PROCEDURE — 82950 GLUCOSE TEST: CPT

## 2025-08-20 PROCEDURE — 85027 COMPLETE CBC AUTOMATED: CPT

## 2025-08-20 PROCEDURE — 0502F SUBSEQUENT PRENATAL CARE: CPT | Performed by: OBSTETRICS & GYNECOLOGY

## 2025-08-21 ENCOUNTER — RESULTS FOLLOW-UP (OUTPATIENT)
Dept: OBGYN | Facility: MEDICAL CENTER | Age: 30
End: 2025-08-21
Payer: MEDICAID

## 2025-08-28 ENCOUNTER — HOSPITAL ENCOUNTER (EMERGENCY)
Facility: MEDICAL CENTER | Age: 30
End: 2025-08-28
Attending: STUDENT IN AN ORGANIZED HEALTH CARE EDUCATION/TRAINING PROGRAM | Admitting: STUDENT IN AN ORGANIZED HEALTH CARE EDUCATION/TRAINING PROGRAM
Payer: MEDICAID

## 2025-08-28 VITALS
WEIGHT: 214.07 LBS | DIASTOLIC BLOOD PRESSURE: 76 MMHG | TEMPERATURE: 96.1 F | HEART RATE: 88 BPM | SYSTOLIC BLOOD PRESSURE: 109 MMHG | BODY MASS INDEX: 35.62 KG/M2

## 2025-08-28 PROCEDURE — 59025 FETAL NON-STRESS TEST: CPT

## 2025-08-28 PROCEDURE — 99284 EMERGENCY DEPT VISIT MOD MDM: CPT
